# Patient Record
Sex: FEMALE | Race: WHITE | HISPANIC OR LATINO
[De-identification: names, ages, dates, MRNs, and addresses within clinical notes are randomized per-mention and may not be internally consistent; named-entity substitution may affect disease eponyms.]

---

## 2017-03-13 ENCOUNTER — APPOINTMENT (OUTPATIENT)
Dept: PLASTIC SURGERY | Facility: CLINIC | Age: 27
End: 2017-03-13

## 2017-03-13 VITALS
HEIGHT: 64 IN | WEIGHT: 186 LBS | SYSTOLIC BLOOD PRESSURE: 125 MMHG | DIASTOLIC BLOOD PRESSURE: 79 MMHG | TEMPERATURE: 97.8 F | HEART RATE: 70 BPM | BODY MASS INDEX: 31.76 KG/M2

## 2017-03-13 DIAGNOSIS — E65 LOCALIZED ADIPOSITY: ICD-10-CM

## 2017-03-13 DIAGNOSIS — M79.3 PANNICULITIS, UNSPECIFIED: ICD-10-CM

## 2017-03-15 PROBLEM — M79.3 PANNICULITIS: Status: ACTIVE | Noted: 2017-03-15

## 2017-03-15 PROBLEM — E65 ABDOMINAL PANNICULUS: Status: ACTIVE | Noted: 2017-03-15

## 2017-03-16 ENCOUNTER — TRANSCRIPTION ENCOUNTER (OUTPATIENT)
Age: 27
End: 2017-03-16

## 2018-07-07 ENCOUNTER — EMERGENCY (EMERGENCY)
Facility: HOSPITAL | Age: 28
LOS: 1 days | Discharge: HOME | End: 2018-07-07
Attending: EMERGENCY MEDICINE
Payer: COMMERCIAL

## 2018-07-07 VITALS
SYSTOLIC BLOOD PRESSURE: 133 MMHG | RESPIRATION RATE: 20 BRPM | TEMPERATURE: 99 F | HEART RATE: 101 BPM | DIASTOLIC BLOOD PRESSURE: 89 MMHG | OXYGEN SATURATION: 100 %

## 2018-07-07 PROCEDURE — 99283 EMERGENCY DEPT VISIT LOW MDM: CPT | Mod: 25

## 2018-07-07 PROCEDURE — 99282 EMERGENCY DEPT VISIT SF MDM: CPT

## 2018-07-07 RX ORDER — SODIUM CHLORIDE 9 MG/ML
1000 INJECTION INTRAMUSCULAR; INTRAVENOUS; SUBCUTANEOUS ONCE
Qty: 0 | Refills: 0 | Status: DISCONTINUED | OUTPATIENT
Start: 2018-07-07 | End: 2018-07-07

## 2018-07-07 RX ORDER — ACETAMINOPHEN 500 MG
650 TABLET ORAL ONCE
Qty: 0 | Refills: 0 | Status: DISCONTINUED | OUTPATIENT
Start: 2018-07-07 | End: 2018-07-07

## 2018-07-07 NOTE — ED ADULT NURSE NOTE - CHPI ED SYMPTOMS NEG
no vomiting/no abdominal distension/no nausea/no dysuria/no hematuria/no blood in stool/no fever/no burning urination

## 2018-07-07 NOTE — ED PROVIDER NOTE - PLAN OF CARE
You were seen in the emergency department for diarrhea. Please follow up with your primary doctor in the next 3-5 days. Make sure to drink plenty of fluids. Return to the emergency department immediately if you experience fever, inability to keep food down, worsening abdominal pain, or any other concerning symptoms.

## 2018-07-07 NOTE — ED PROVIDER NOTE - MEDICAL DECISION MAKING DETAILS
28f w acute-onset diarrhea and abd cramping w/o n/v. NAD, afebrile, benign abd exam. Sx likely attributable to viral gastroenteritis, low susp for serious intraabd pathology. D/w pt, who would like to go home. Hemodyn stable, tolerating po - no need for iv hydration or further w.u 28f w acute-onset diarrhea and abd cramping w/o n/v. NAD, afebrile, benign abd exam, no signs dehyd. Sx likely attributable to viral gastroenteritis, low susp for serious intraabd pathology. D/w pt, who would like to go home. Hemodyn stable, tolerating po - no need for iv hydration or further w.u 28f w acute-onset diarrhea and abd cramping w/o n/v. NAD, afebrile, benign abd exam, no signs dehyd other than mild tachy to 101 in triage improved since. Sx likely attributable to viral gastroenteritis, low susp for serious intraabd pathology. D/w pt, who would like to go home. Hemodyn stable, tolerating po - no need for iv hydration or further w.u

## 2018-07-07 NOTE — ED PROVIDER NOTE - CARE PLAN
Principal Discharge DX:	Diarrhea  Assessment and plan of treatment:	You were seen in the emergency department for diarrhea. Please follow up with your primary doctor in the next 3-5 days. Make sure to drink plenty of fluids. Return to the emergency department immediately if you experience fever, inability to keep food down, worsening abdominal pain, or any other concerning symptoms.

## 2018-07-07 NOTE — ED PROVIDER NOTE - SKIN, MLM
Skin normal color for race, warm, dry and intact. No evidence of rash. Skin normal color for race, warm, dry and intact. No evidence of rash. Cap refill <2 s

## 2018-07-07 NOTE — ED PROVIDER NOTE - OBJECTIVE STATEMENT
28f no sig PMH here c/o 9 hrs diarrhea. Pt was in [facility] when suddenly started having continuous, liquid NB bm. No n/v, mild abd cramping. 28f no sig PMH here c/o 9 hrs diarrhea. Pt was in [facility] when suddenly started having continuous, liquid NB bm. No fever, no n/v, mild abd cramping. No urinary complaints. No recent new foods, traveled to [place] 1 mo ago. No sick contacts. No medications. No URI sx.

## 2019-03-11 ENCOUNTER — OUTPATIENT (OUTPATIENT)
Dept: OUTPATIENT SERVICES | Facility: HOSPITAL | Age: 29
LOS: 1 days | End: 2019-03-11
Payer: COMMERCIAL

## 2019-03-11 VITALS — HEART RATE: 73 BPM | OXYGEN SATURATION: 99 %

## 2019-03-11 DIAGNOSIS — Z3A.00 WEEKS OF GESTATION OF PREGNANCY NOT SPECIFIED: ICD-10-CM

## 2019-03-11 DIAGNOSIS — O26.899 OTHER SPECIFIED PREGNANCY RELATED CONDITIONS, UNSPECIFIED TRIMESTER: ICD-10-CM

## 2019-03-11 LAB
ALBUMIN SERPL ELPH-MCNC: 3.3 G/DL — SIGNIFICANT CHANGE UP (ref 3.3–5)
ALP SERPL-CCNC: 77 U/L — SIGNIFICANT CHANGE UP (ref 40–120)
ALT FLD-CCNC: 20 U/L — SIGNIFICANT CHANGE UP (ref 4–33)
ANION GAP SERPL CALC-SCNC: 11 MMO/L — SIGNIFICANT CHANGE UP (ref 7–14)
APPEARANCE UR: SIGNIFICANT CHANGE UP
AST SERPL-CCNC: 24 U/L — SIGNIFICANT CHANGE UP (ref 4–32)
BACTERIA # UR AUTO: NEGATIVE — SIGNIFICANT CHANGE UP
BILIRUB SERPL-MCNC: < 0.2 MG/DL — LOW (ref 0.2–1.2)
BILIRUB UR-MCNC: NEGATIVE — SIGNIFICANT CHANGE UP
BLOOD UR QL VISUAL: NEGATIVE — SIGNIFICANT CHANGE UP
BUN SERPL-MCNC: 10 MG/DL — SIGNIFICANT CHANGE UP (ref 7–23)
CALCIUM SERPL-MCNC: 8.9 MG/DL — SIGNIFICANT CHANGE UP (ref 8.4–10.5)
CHLORIDE SERPL-SCNC: 106 MMOL/L — SIGNIFICANT CHANGE UP (ref 98–107)
CO2 SERPL-SCNC: 21 MMOL/L — LOW (ref 22–31)
COLOR SPEC: YELLOW — SIGNIFICANT CHANGE UP
CREAT SERPL-MCNC: 0.54 MG/DL — SIGNIFICANT CHANGE UP (ref 0.5–1.3)
GLUCOSE SERPL-MCNC: 74 MG/DL — SIGNIFICANT CHANGE UP (ref 70–99)
GLUCOSE UR-MCNC: NEGATIVE — SIGNIFICANT CHANGE UP
HCT VFR BLD CALC: 33.1 % — LOW (ref 34.5–45)
HGB BLD-MCNC: 10.9 G/DL — LOW (ref 11.5–15.5)
HYALINE CASTS # UR AUTO: HIGH
KETONES UR-MCNC: NEGATIVE — SIGNIFICANT CHANGE UP
LEUKOCYTE ESTERASE UR-ACNC: NEGATIVE — SIGNIFICANT CHANGE UP
MCHC RBC-ENTMCNC: 28.7 PG — SIGNIFICANT CHANGE UP (ref 27–34)
MCHC RBC-ENTMCNC: 32.9 % — SIGNIFICANT CHANGE UP (ref 32–36)
MCV RBC AUTO: 87.1 FL — SIGNIFICANT CHANGE UP (ref 80–100)
NITRITE UR-MCNC: NEGATIVE — SIGNIFICANT CHANGE UP
NRBC # FLD: 0 K/UL — LOW (ref 25–125)
PH UR: 6 — SIGNIFICANT CHANGE UP (ref 5–8)
PLATELET # BLD AUTO: 278 K/UL — SIGNIFICANT CHANGE UP (ref 150–400)
PMV BLD: 10.1 FL — SIGNIFICANT CHANGE UP (ref 7–13)
POTASSIUM SERPL-MCNC: 4.1 MMOL/L — SIGNIFICANT CHANGE UP (ref 3.5–5.3)
POTASSIUM SERPL-SCNC: 4.1 MMOL/L — SIGNIFICANT CHANGE UP (ref 3.5–5.3)
PROT SERPL-MCNC: 6.1 G/DL — SIGNIFICANT CHANGE UP (ref 6–8.3)
PROT UR-MCNC: 10 — SIGNIFICANT CHANGE UP
RBC # BLD: 3.8 M/UL — SIGNIFICANT CHANGE UP (ref 3.8–5.2)
RBC # FLD: 15.5 % — HIGH (ref 10.3–14.5)
RBC CASTS # UR COMP ASSIST: HIGH (ref 0–?)
SODIUM SERPL-SCNC: 138 MMOL/L — SIGNIFICANT CHANGE UP (ref 135–145)
SP GR SPEC: 1.03 — SIGNIFICANT CHANGE UP (ref 1–1.04)
SQUAMOUS # UR AUTO: SIGNIFICANT CHANGE UP
UROBILINOGEN FLD QL: NORMAL — SIGNIFICANT CHANGE UP
WBC # BLD: 10.5 K/UL — SIGNIFICANT CHANGE UP (ref 3.8–10.5)
WBC # FLD AUTO: 10.5 K/UL — SIGNIFICANT CHANGE UP (ref 3.8–10.5)
WBC UR QL: HIGH (ref 0–?)

## 2019-03-11 PROCEDURE — 93970 EXTREMITY STUDY: CPT | Mod: 26

## 2019-03-11 PROCEDURE — 59025 FETAL NON-STRESS TEST: CPT | Mod: 26

## 2019-03-11 PROCEDURE — 93010 ELECTROCARDIOGRAM REPORT: CPT

## 2019-03-11 PROCEDURE — 76815 OB US LIMITED FETUS(S): CPT | Mod: 26

## 2019-03-11 PROCEDURE — 99213 OFFICE O/P EST LOW 20 MIN: CPT | Mod: 25

## 2019-03-11 RX ORDER — SODIUM CHLORIDE 9 MG/ML
3 INJECTION INTRAMUSCULAR; INTRAVENOUS; SUBCUTANEOUS ONCE
Qty: 0 | Refills: 0 | Status: DISCONTINUED | OUTPATIENT
Start: 2019-03-11 | End: 2019-03-26

## 2019-04-08 ENCOUNTER — EMERGENCY (EMERGENCY)
Facility: HOSPITAL | Age: 29
LOS: 1 days | Discharge: HOME | End: 2019-04-08
Attending: EMERGENCY MEDICINE | Admitting: EMERGENCY MEDICINE
Payer: COMMERCIAL

## 2019-04-08 VITALS
RESPIRATION RATE: 20 BRPM | DIASTOLIC BLOOD PRESSURE: 77 MMHG | HEART RATE: 86 BPM | TEMPERATURE: 98 F | SYSTOLIC BLOOD PRESSURE: 117 MMHG | OXYGEN SATURATION: 100 %

## 2019-04-08 VITALS
HEART RATE: 78 BPM | SYSTOLIC BLOOD PRESSURE: 122 MMHG | RESPIRATION RATE: 17 BRPM | DIASTOLIC BLOOD PRESSURE: 44 MMHG | OXYGEN SATURATION: 100 %

## 2019-04-08 LAB
ALBUMIN SERPL ELPH-MCNC: 3.4 G/DL — SIGNIFICANT CHANGE UP (ref 3.3–5)
ALP SERPL-CCNC: 106 U/L — SIGNIFICANT CHANGE UP (ref 40–120)
ALT FLD-CCNC: 13 U/L — SIGNIFICANT CHANGE UP (ref 4–33)
ANION GAP SERPL CALC-SCNC: 11 MMO/L — SIGNIFICANT CHANGE UP (ref 7–14)
AST SERPL-CCNC: 14 U/L — SIGNIFICANT CHANGE UP (ref 4–32)
BASOPHILS # BLD AUTO: 0.04 K/UL — SIGNIFICANT CHANGE UP (ref 0–0.2)
BASOPHILS NFR BLD AUTO: 0.4 % — SIGNIFICANT CHANGE UP (ref 0–2)
BILIRUB SERPL-MCNC: < 0.2 MG/DL — LOW (ref 0.2–1.2)
BUN SERPL-MCNC: 8 MG/DL — SIGNIFICANT CHANGE UP (ref 7–23)
CALCIUM SERPL-MCNC: 8.7 MG/DL — SIGNIFICANT CHANGE UP (ref 8.4–10.5)
CHLORIDE SERPL-SCNC: 104 MMOL/L — SIGNIFICANT CHANGE UP (ref 98–107)
CO2 SERPL-SCNC: 20 MMOL/L — LOW (ref 22–31)
CREAT SERPL-MCNC: 0.45 MG/DL — LOW (ref 0.5–1.3)
EOSINOPHIL # BLD AUTO: 0.22 K/UL — SIGNIFICANT CHANGE UP (ref 0–0.5)
EOSINOPHIL NFR BLD AUTO: 2 % — SIGNIFICANT CHANGE UP (ref 0–6)
GLUCOSE SERPL-MCNC: 82 MG/DL — SIGNIFICANT CHANGE UP (ref 70–99)
HCT VFR BLD CALC: 33.1 % — LOW (ref 34.5–45)
HGB BLD-MCNC: 10.9 G/DL — LOW (ref 11.5–15.5)
IMM GRANULOCYTES NFR BLD AUTO: 1 % — SIGNIFICANT CHANGE UP (ref 0–1.5)
LYMPHOCYTES # BLD AUTO: 2.78 K/UL — SIGNIFICANT CHANGE UP (ref 1–3.3)
LYMPHOCYTES # BLD AUTO: 25.5 % — SIGNIFICANT CHANGE UP (ref 13–44)
MCHC RBC-ENTMCNC: 28.5 PG — SIGNIFICANT CHANGE UP (ref 27–34)
MCHC RBC-ENTMCNC: 32.9 % — SIGNIFICANT CHANGE UP (ref 32–36)
MCV RBC AUTO: 86.4 FL — SIGNIFICANT CHANGE UP (ref 80–100)
MONOCYTES # BLD AUTO: 0.74 K/UL — SIGNIFICANT CHANGE UP (ref 0–0.9)
MONOCYTES NFR BLD AUTO: 6.8 % — SIGNIFICANT CHANGE UP (ref 2–14)
NEUTROPHILS # BLD AUTO: 7.03 K/UL — SIGNIFICANT CHANGE UP (ref 1.8–7.4)
NEUTROPHILS NFR BLD AUTO: 64.3 % — SIGNIFICANT CHANGE UP (ref 43–77)
NRBC # FLD: 0 K/UL — SIGNIFICANT CHANGE UP (ref 0–0)
PLATELET # BLD AUTO: 253 K/UL — SIGNIFICANT CHANGE UP (ref 150–400)
PMV BLD: 10.2 FL — SIGNIFICANT CHANGE UP (ref 7–13)
POTASSIUM SERPL-MCNC: 3.8 MMOL/L — SIGNIFICANT CHANGE UP (ref 3.5–5.3)
POTASSIUM SERPL-SCNC: 3.8 MMOL/L — SIGNIFICANT CHANGE UP (ref 3.5–5.3)
PROT SERPL-MCNC: 6.1 G/DL — SIGNIFICANT CHANGE UP (ref 6–8.3)
RBC # BLD: 3.83 M/UL — SIGNIFICANT CHANGE UP (ref 3.8–5.2)
RBC # FLD: 14.1 % — SIGNIFICANT CHANGE UP (ref 10.3–14.5)
SODIUM SERPL-SCNC: 135 MMOL/L — SIGNIFICANT CHANGE UP (ref 135–145)
TSH SERPL-MCNC: 3.05 UIU/ML — SIGNIFICANT CHANGE UP (ref 0.27–4.2)
WBC # BLD: 10.92 K/UL — HIGH (ref 3.8–10.5)
WBC # FLD AUTO: 10.92 K/UL — HIGH (ref 3.8–10.5)

## 2019-04-08 PROCEDURE — 93010 ELECTROCARDIOGRAM REPORT: CPT

## 2019-04-08 PROCEDURE — 99283 EMERGENCY DEPT VISIT LOW MDM: CPT | Mod: 25

## 2019-04-08 NOTE — ED ADULT NURSE NOTE - OBJECTIVE STATEMENT
pt a&o x3 c/o intermittent palpitations x 1 month with an episode of sudden onset today. pt currently deny any symptoms but reports during dinner she had an episdoe of palpitations with sob. [facility], awaiting results. . also endorsing white clear discharge since today. nad noted. vss. will monitor

## 2019-04-08 NOTE — ED ADULT TRIAGE NOTE - CHIEF COMPLAINT QUOTE
pt is 8 months pregnant and is having palpitation and  SOB. BRYNN 05/19. Pt was seen here a month ago for the same and was seen by cardiologist and had echo and Holter monitor . Did not get the result of the test. Tonight as she was having dinner she started having SOB. G2. Pt denies  CP or any other symptoms. Also c/o vaginal discharge which is excessive today.  Positive fetal movements. L&D was called and pt is to be seen in the  ER.

## 2019-04-08 NOTE — ED PROVIDER NOTE - CLINICAL SUMMARY MEDICAL DECISION MAKING FREE TEXT BOX
Jonathan Weil, PGY2 - palpitations and dyspnea similar to prior episodes, now resolved. Low suspicion for a serious cardiopulmonary process such as cardiomyopathy of pregnancy or PE given resolution, intermittent nature, clear lungs, normal vitals, negative prior BLE dopplers. Will obtain labs, repeat ECG, to L&D for eval of vaginal discharge if negative eval here.

## 2019-04-08 NOTE — ED PROVIDER NOTE - OBJECTIVE STATEMENT
28F  ~8 months gestation by sono p/w shortness of breath. She has had intermittent episodes of shortness of breath associated w/ palpitations, occurring several times per week, lasting minutes to hours, for the past month. Seen by cardiology for echo and loop recorder, unknown results. Today had another episode an hour or so before arrival, since resolved, similar to prior episodes. Additionally she has had a slow but consistent leak of clear vaginal discharge starting earlier today. No abd pain. Has felt normal fetal movement. 28F  ~8 months gestation by sono (LMP 18) p/w shortness of breath. She has had intermittent episodes of shortness of breath associated w/ palpitations, occurring several times per week, lasting minutes to hours, for the past month. Seen by cardiology for echo and loop recorder, unknown results. Today had another episode an hour or so before arrival, since resolved, similar to prior episodes. Additionally she has had a slow but consistent leak of clear vaginal discharge starting earlier today. No abd pain. Has felt normal fetal movement.

## 2019-04-08 NOTE — ED PROVIDER NOTE - PROGRESS NOTE DETAILS
Jonathan Weil, PGY2 - labs and ECG unrevealing. patient is asymptomatic. DC to L&D. Discussed importance of f/u with cardiology for echo/loop recorder results tomorrow.

## 2019-04-08 NOTE — ED PROVIDER NOTE - ATTENDING CONTRIBUTION TO CARE
28F  ~8 months gestation by sono (LMP 18) p/w shortness of breath. She has had intermittent episodes of shortness of breath associated w/ palpitations, occurring several times per week, lasting minutes to hours, for the past month. Seen by cardiology for echo and loop recorder, unknown results. Today had another episode an hour or so before arrival, since resolved, similar to prior episodes. Additionally she has had a slow but consistent leak of clear vaginal discharge starting earlier today. No abd pain. Has felt normal fetal movement. Currently feels well No CP No SOB No fever no cough. FMF. On exam: VSS HR 78, sat 100%, RR 14, lungs, heart, pulses, neuro, extremities, skin, all normal on exam. Abdomen c/w 34w gestation. FMF FH of 146 detected with doppler. EKG Normal. IMP: Transient dyspnea at 34 weeks gestation. No clinical evidence of acute cardiac or pulmonary emergency. Pt being monitored and observed. awaiting labs. Also of concern clear vag leaking. OB L&D triage [id number] notified and GYN resident Dr Gallagher notified.

## 2019-04-09 ENCOUNTER — OUTPATIENT (OUTPATIENT)
Dept: OUTPATIENT SERVICES | Facility: HOSPITAL | Age: 29
LOS: 1 days | Discharge: HOME | End: 2019-04-09
Payer: COMMERCIAL

## 2019-04-09 VITALS — HEART RATE: 66 BPM | OXYGEN SATURATION: 97 %

## 2019-04-09 DIAGNOSIS — Z98.890 OTHER SPECIFIED POSTPROCEDURAL STATES: Chronic | ICD-10-CM

## 2019-04-09 DIAGNOSIS — Z83.79 FAMILY HISTORY OF OTHER DISEASES OF THE DIGESTIVE SYSTEM: Chronic | ICD-10-CM

## 2019-04-09 DIAGNOSIS — Z3A.00 WEEKS OF GESTATION OF PREGNANCY NOT SPECIFIED: ICD-10-CM

## 2019-04-09 DIAGNOSIS — O26.899 OTHER SPECIFIED PREGNANCY RELATED CONDITIONS, UNSPECIFIED TRIMESTER: ICD-10-CM

## 2019-04-09 PROCEDURE — 59025 FETAL NON-STRESS TEST: CPT | Mod: 26

## 2019-04-09 NOTE — OB PROVIDER TRIAGE NOTE - NSHPLABSRESULTS_GEN_ALL_CORE
10.9   10.92 )-----------( 253      ( 08 Apr 2019 22:45 )             33.1       04-08    135  |  104  |  8   ----------------------------<  82  3.8   |  20<L>  |  0.45<L>    Ca    8.7      08 Apr 2019 22:45    TPro  6.1  /  Alb  3.4  /  TBili  < 0.2<L>  /  DBili  x   /  AST  14  /  ALT  13  /  AlkPhos  106  04-08

## 2019-04-09 NOTE — OB RN TRIAGE NOTE - PSH
delivery delivered  C/S 2009  FH: cholecystectomy  Lap Stephany 2009  History of dilatation and curettage  D/C x 2  History of gastric surgery  Gastric sleeve 2016

## 2019-04-09 NOTE — OB RN TRIAGE NOTE - CHIEF COMPLAINT QUOTE
For OB clearance, Pt seen in ER  for SOB and palpitation For OB clearance, Pt seen in ER  for SOB and palpitation.  "I have been feeling wet all day.  Not sure if I broke my water."

## 2019-04-09 NOTE — OB PROVIDER TRIAGE NOTE - NS_MATERNALCONCERNS_OBGYN_ALL_OB_FT
Patient states she is followed by Cardiologist due to h/o SOB and palpitation for the past month  She had echo done and has a follow up for a holter monitor

## 2019-04-09 NOTE — OB PROVIDER TRIAGE NOTE - NSHPPHYSICALEXAM_GEN_ALL_CORE
Vital Signs Last 24 Hrs  T(C): 36.8 (09 Apr 2019 00:46), Max: 36.9 (08 Apr 2019 20:32)  T(F): 98.2 (09 Apr 2019 00:46), Max: 98.5 (08 Apr 2019 20:32)  HR: 66 (09 Apr 2019 01:17) (63 - 86)  BP: 108/55 (09 Apr 2019 00:58) (108/55 - 122/44)  BP(mean): --  RR: 18 (09 Apr 2019 00:46) (17 - 20)  SpO2: 97% (09 Apr 2019 01:17) (97% - 100%)    Abdomen: Gravid, soft, NT  NST: Reactive, mod variability, no decels  Rotan: no contractions    SSE: no pooling, nitrazine neg, fern neg; Cervix appears long and closed on SSE    TAS: SLIUP, Cephalic, DANY: 13, BPP 8/8

## 2019-04-09 NOTE — OB PROVIDER TRIAGE NOTE - HISTORY OF PRESENT ILLNESS
SONAL KHALIL  28y  Female [id number]  28y  y/o  at 34w2d sent to triage from ED for OB clearance. Pt was seen in ED due to complaints of SOB and palpitation. Pt was evaluated and cleared.  Present reports +FM, no vaginal bleeding, States had a couple episodes of wetness earlier today, no other episodes.  Denies any cramping or contractions.    AP complications: Denies

## 2019-10-16 ENCOUNTER — TRANSCRIPTION ENCOUNTER (OUTPATIENT)
Age: 29
End: 2019-10-16

## 2020-02-10 PROBLEM — I10 ESSENTIAL (PRIMARY) HYPERTENSION: Chronic | Status: ACTIVE | Noted: 2019-04-09

## 2020-02-12 ENCOUNTER — APPOINTMENT (OUTPATIENT)
Dept: GASTROENTEROLOGY | Facility: CLINIC | Age: 30
End: 2020-02-12
Payer: MEDICAID

## 2020-02-12 VITALS
WEIGHT: 198 LBS | HEIGHT: 64 IN | RESPIRATION RATE: 14 BRPM | OXYGEN SATURATION: 100 % | DIASTOLIC BLOOD PRESSURE: 78 MMHG | SYSTOLIC BLOOD PRESSURE: 120 MMHG | BODY MASS INDEX: 33.8 KG/M2 | HEART RATE: 98 BPM

## 2020-02-12 DIAGNOSIS — Z80.0 FAMILY HISTORY OF MALIGNANT NEOPLASM OF DIGESTIVE ORGANS: ICD-10-CM

## 2020-02-12 DIAGNOSIS — Z86.79 PERSONAL HISTORY OF OTHER DISEASES OF THE CIRCULATORY SYSTEM: ICD-10-CM

## 2020-02-12 DIAGNOSIS — K92.1 MELENA: ICD-10-CM

## 2020-02-12 DIAGNOSIS — Z87.09 PERSONAL HISTORY OF OTHER DISEASES OF THE RESPIRATORY SYSTEM: ICD-10-CM

## 2020-02-12 DIAGNOSIS — Z78.9 OTHER SPECIFIED HEALTH STATUS: ICD-10-CM

## 2020-02-12 PROCEDURE — 99205 OFFICE O/P NEW HI 60 MIN: CPT

## 2020-02-12 NOTE — HISTORY OF PRESENT ILLNESS
[de-identified] : 28 y/o woman with Hx of obesity s/p gastric sleeve (2016), lap hansa, , iron def anemia requiring iron infusions who is referred for anemia and to have blood in stool. \par  \par In 2019 she was found to have H/H of 11.4/37.5. \par \par Patient was anemia during pregnancy, requiring iron infusion - last infusion almost a year ago. Was told that anemia was most likely from menorrhagia - still with heavy menses for three days.  \par \par Stools are hard since gastric sleeve - when she strains she will see red blood in the toilet bowl.   She has a movement every day, twice a day.  Sometimes she has urgency and will have diarrhea.  \par \par Taking omeprazole for acid reflux since the gastric sleeve - Takes omeprazole on a PRN bases.  \par \par \par \par Last EGD was in 2018 - normal from what she recalls.  \par \par Never had a colonoscopy. \par \par Maternal grandmother had stomach cancer.   Patient denies family Hx of other GI cancers. Denies IBD in family. \par \par All other review of systems are negative.  Denies cardiac symptoms.\par \par Has a 9 month old.

## 2020-02-12 NOTE — PHYSICAL EXAM
[General Appearance - Alert] : alert [General Appearance - In No Acute Distress] : in no acute distress [Sclera] : the sclera and conjunctiva were normal [Hearing Threshold Finger Rub Not [place]] : hearing was normal [Extraocular Movements] : extraocular movements were intact [Outer Ear] : the ears and nose were normal in appearance [Neck Cervical Mass (___cm)] : no neck mass was observed [Neck Appearance] : the appearance of the neck was normal [Heart Rate And Rhythm] : heart rate was normal and rhythm regular [Auscultation Breath Sounds / Voice Sounds] : lungs were clear to auscultation bilaterally [Heart Sounds] : normal S1 and S2 [Murmurs] : no murmurs [Heart Sounds Gallop] : no gallops [Abdomen Soft] : soft [Heart Sounds Pericardial Friction Rub] : no pericardial rub [Bowel Sounds] : normal bowel sounds [Abdomen Tenderness] : non-tender [Abdomen Mass (___ Cm)] : no abdominal mass palpated [Cervical Lymph Nodes Enlarged Anterior Bilaterally] : anterior cervical [Cervical Lymph Nodes Enlarged Posterior Bilaterally] : posterior cervical [Supraclavicular Lymph Nodes Enlarged Bilaterally] : supraclavicular [Abnormal Walk] : normal gait [Nail Clubbing] : no clubbing  or cyanosis of the fingernails [] : no rash [Oriented To Time, Place, And Person] : oriented to person, place, and time [Skin Color & Pigmentation] : normal skin color and pigmentation [Impaired Insight] : insight and judgment were intact [Affect] : the affect was normal

## 2020-02-12 NOTE — ASSESSMENT
[FreeTextEntry1] : 30 y/o woman with Hx of obesity s/p gastric sleeve (2016), lap hansa, , iron def anemia requiring iron infusions who is referred for anemia and to have blood in stool. Differential diagnosis reviewed.  Anemia likely from menorrhagia.  Blood in stool likely from hemorrhoids.  However she is concerned, her grandmother had stomach cancer.    I will therefore plan for an upper endoscopy under monitored anesthesia care to rule out esophagitis, peptic ulcer disease, H.pylori gastritis etc.   I will also plan for a colonoscopy to rule out inflammatory bowel disease, colon polyps, colorectal cancer etc. under monitored anesthesia care.  Risks such as perforation requiring surgery, bleeding, infection, colitis, internal organ injury, etc, and risks of anesthesia including cardiopulmonary compromise were discussed with patient.  Patient verbalized understanding and agrees to proceed with the planned procedure.\par \par Metamucil once a day with plenty of water a day.

## 2020-03-06 ENCOUNTER — TRANSCRIPTION ENCOUNTER (OUTPATIENT)
Age: 30
End: 2020-03-06

## 2020-04-20 NOTE — ED ADULT NURSE NOTE - DOES PATIENT HAVE ADVANCE DIRECTIVE
Â· Med Name & Dose: Metoprolol  Â· Last refill was 11-6-19 Number of Refills sent: 1  Â· Quantity of medication given 90 day supply  Â· Last visit for that condition 11-6-19  Â· Date of next appt: 6/15/2020  Â· Date of any Lab results pertaining to medication: 4-    rx sent to pharmacy to get patient to appt 6- unk

## 2020-06-15 ENCOUNTER — APPOINTMENT (OUTPATIENT)
Dept: DISASTER EMERGENCY | Facility: CLINIC | Age: 30
End: 2020-06-15

## 2020-06-15 DIAGNOSIS — Z01.818 ENCOUNTER FOR OTHER PREPROCEDURAL EXAMINATION: ICD-10-CM

## 2020-06-15 LAB — SARS-COV-2 N GENE NPH QL NAA+PROBE: NOT DETECTED

## 2020-06-17 ENCOUNTER — TRANSCRIPTION ENCOUNTER (OUTPATIENT)
Age: 30
End: 2020-06-17

## 2020-06-18 ENCOUNTER — OUTPATIENT (OUTPATIENT)
Dept: OUTPATIENT SERVICES | Facility: HOSPITAL | Age: 30
LOS: 1 days | End: 2020-06-18
Payer: MEDICAID

## 2020-06-18 ENCOUNTER — APPOINTMENT (OUTPATIENT)
Dept: GASTROENTEROLOGY | Facility: HOSPITAL | Age: 30
End: 2020-06-18

## 2020-06-18 ENCOUNTER — RESULT REVIEW (OUTPATIENT)
Age: 30
End: 2020-06-18

## 2020-06-18 DIAGNOSIS — M79.3 PANNICULITIS, UNSPECIFIED: ICD-10-CM

## 2020-06-18 DIAGNOSIS — Z98.890 OTHER SPECIFIED POSTPROCEDURAL STATES: Chronic | ICD-10-CM

## 2020-06-18 DIAGNOSIS — Z83.79 FAMILY HISTORY OF OTHER DISEASES OF THE DIGESTIVE SYSTEM: Chronic | ICD-10-CM

## 2020-06-18 DIAGNOSIS — E65 LOCALIZED ADIPOSITY: ICD-10-CM

## 2020-06-18 LAB — HCG UR QL: NEGATIVE — SIGNIFICANT CHANGE UP

## 2020-06-18 PROCEDURE — 88313 SPECIAL STAINS GROUP 2: CPT | Mod: 26

## 2020-06-18 PROCEDURE — 45385 COLONOSCOPY W/LESION REMOVAL: CPT

## 2020-06-18 PROCEDURE — 88305 TISSUE EXAM BY PATHOLOGIST: CPT

## 2020-06-18 PROCEDURE — 43239 EGD BIOPSY SINGLE/MULTIPLE: CPT

## 2020-06-18 PROCEDURE — 81025 URINE PREGNANCY TEST: CPT

## 2020-06-18 PROCEDURE — 88312 SPECIAL STAINS GROUP 1: CPT

## 2020-06-18 PROCEDURE — 88305 TISSUE EXAM BY PATHOLOGIST: CPT | Mod: 26

## 2020-06-18 PROCEDURE — 88312 SPECIAL STAINS GROUP 1: CPT | Mod: 26

## 2020-06-18 PROCEDURE — 88313 SPECIAL STAINS GROUP 2: CPT

## 2020-08-03 ENCOUNTER — APPOINTMENT (OUTPATIENT)
Dept: GASTROENTEROLOGY | Facility: CLINIC | Age: 30
End: 2020-08-03
Payer: MEDICAID

## 2020-08-03 VITALS
HEART RATE: 99 BPM | DIASTOLIC BLOOD PRESSURE: 85 MMHG | WEIGHT: 198 LBS | HEIGHT: 64 IN | RESPIRATION RATE: 14 BRPM | BODY MASS INDEX: 33.8 KG/M2 | SYSTOLIC BLOOD PRESSURE: 124 MMHG | OXYGEN SATURATION: 99 %

## 2020-08-03 DIAGNOSIS — D12.6 BENIGN NEOPLASM OF COLON, UNSPECIFIED: ICD-10-CM

## 2020-08-03 PROCEDURE — 99214 OFFICE O/P EST MOD 30 MIN: CPT

## 2020-08-03 NOTE — PHYSICAL EXAM
[General Appearance - Alert] : alert [General Appearance - In No Acute Distress] : in no acute distress [Sclera] : the sclera and conjunctiva were normal [Extraocular Movements] : extraocular movements were intact [Outer Ear] : the ears and nose were normal in appearance [Hearing Threshold Finger Rub Not [place]] : hearing was normal [Neck Cervical Mass (___cm)] : no neck mass was observed [Neck Appearance] : the appearance of the neck was normal [Auscultation Breath Sounds / Voice Sounds] : lungs were clear to auscultation bilaterally [Heart Rate And Rhythm] : heart rate was normal and rhythm regular [Murmurs] : no murmurs [Heart Sounds Gallop] : no gallops [Heart Sounds] : normal S1 and S2 [Bowel Sounds] : normal bowel sounds [Abdomen Soft] : soft [Heart Sounds Pericardial Friction Rub] : no pericardial rub [Abdomen Mass (___ Cm)] : no abdominal mass palpated [Abdomen Tenderness] : non-tender [Supraclavicular Lymph Nodes Enlarged Bilaterally] : supraclavicular [Cervical Lymph Nodes Enlarged Posterior Bilaterally] : posterior cervical [Cervical Lymph Nodes Enlarged Anterior Bilaterally] : anterior cervical [Abnormal Walk] : normal gait [Nail Clubbing] : no clubbing  or cyanosis of the fingernails [Musculoskeletal - Swelling] : no joint swelling seen [Skin Color & Pigmentation] : normal skin color and pigmentation [Oriented To Time, Place, And Person] : oriented to person, place, and time [Affect] : the affect was normal [] : no rash [Impaired Insight] : insight and judgment were intact

## 2020-08-03 NOTE — HISTORY OF PRESENT ILLNESS
[de-identified] : 29 y/o woman with Hx of obesity s/p gastric sleeve (2016), lap hansa, , iron def anemia requiring iron infusions who presents for follow up after recent EGD/colonoscopy \par  \par 2020 the patient had a mild erosive esophagitis status post biopsy, small hiatal hernia, sleeve gastrectomy with antral nodule status post biopsy, mild antral erythema this was biopsied, normal duodenum status post biopsy to rule out villous atrophy as the cause of anemia.\par \par The patient had a colonoscopy on the same day and was found to have 8 mm sessile polyp in ascending colon status post cold snare polypectomy. Quality of the bowel prep was adequate for polyps greater than 6 mm.  The colon polyp was a tubular adenoma.\par \par Duodenal biopsies were negative for celiac disease. 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 GE junction biopsies revealed esophagitis with eosinophils count of 11 hpf. \par \par Patient reports having irregular bowel habits - sometimes stools are stringy, pellets, sometimes loose, sometimes feels bloated, occasional nausea.  Occasional heartburn especially after eating spicy foods.  She denies vomiting. \par \par \par \par \par From prior notes: \par In 2019 she was found to have H/H of 11.4/37.5. \par \par Patient was anemia during pregnancy, requiring iron infusion - last infusion almost a year ago. Was told that anemia was most likely from menorrhagia - still with heavy menses for three days. \par \par Stools are hard since gastric sleeve - when she strains she will see red blood in the toilet bowl. She has a movement every day, twice a day. Sometimes she has urgency and will have diarrhea. \par \par Taking omeprazole for acid reflux since the gastric sleeve - Takes omeprazole on a PRN bases. \par \par \par \par Last EGD was in 2018 - normal from what she recalls. \par \par Never had a colonoscopy. \par \par Maternal grandmother had stomach cancer. Patient denies family Hx of other GI cancers. Denies IBD in family.

## 2020-08-03 NOTE — ASSESSMENT
[FreeTextEntry1] : IMPRESSION\par 1.  Erosive Esophagitis\par 2.  Colon tubular adenoma - bowel prep was adequate for polyps >6 mm) \par 3.  Antral nodule suggestive of hamartomatous polyp\par 4.  History of Iron def anemia requiring iron infusions - initially referred to exclude GI causes, s/p only EGD/colonoscopy - capsule not yet done\par 5.  Irregular bowel habits\par 6.  Bloating\par 7.  Multiple abdominal surgeries (s/p gastric sleeve (2016), lap hansa, )\par \par \par \par \par \par PLAN: \par 1.  I will plan for an upper endoscopy under monitored anesthesia care to rule out persistent erosive esophagitis,  Chavez's esophagus, peptic ulcer disease, H.pylori gastritis etc.   Risks, benefits, and alternatives of the procedure were discussed with the patient. Patient understands and agrees to proceed with the planned procedure.\par 2.  I have advised the patient to arrange for a colonoscopy to rule out colon polyps, colorectal cancer etc. under monitored anesthesia care.  Risks such as perforation  (4 in 10,000) requiring surgery, bleeding (8 in 10,000), infection, diverticulitis, colitis, missed colon cancer (2% to 6%), internal organ injury, etc, risks of bowel prep including colitis, syncope, adverse reaction to medication etc. and risks of anesthesia including cardiopulmonary compromise were discussed with patient.  Patient verbalized understanding and agrees to proceed with the planned procedure.\par 3.  Recommended a capsule endoscopy to r/o small bowel causes of iron def anemia - risks reviewed - alternatives to a capsule endoscopy such as CT enterography was discussed and she would like a capsule endoscopy. \par 4.  Omeprazole once a day\par 5.  Metamucil once a day

## 2020-08-10 ENCOUNTER — APPOINTMENT (OUTPATIENT)
Dept: GASTROENTEROLOGY | Facility: CLINIC | Age: 30
End: 2020-08-10

## 2020-11-13 ENCOUNTER — APPOINTMENT (OUTPATIENT)
Dept: DERMATOLOGY | Facility: CLINIC | Age: 30
End: 2020-11-13
Payer: MEDICAID

## 2020-11-13 VITALS — HEIGHT: 64 IN | WEIGHT: 200 LBS | BODY MASS INDEX: 34.15 KG/M2

## 2020-11-13 DIAGNOSIS — L73.9 FOLLICULAR DISORDER, UNSPECIFIED: ICD-10-CM

## 2020-11-13 PROCEDURE — 99072 ADDL SUPL MATRL&STAF TM PHE: CPT

## 2020-11-13 PROCEDURE — 99203 OFFICE O/P NEW LOW 30 MIN: CPT

## 2021-01-03 ENCOUNTER — LABORATORY RESULT (OUTPATIENT)
Age: 31
End: 2021-01-03

## 2021-01-04 ENCOUNTER — APPOINTMENT (OUTPATIENT)
Dept: DISASTER EMERGENCY | Facility: CLINIC | Age: 31
End: 2021-01-04

## 2021-01-06 ENCOUNTER — TRANSCRIPTION ENCOUNTER (OUTPATIENT)
Age: 31
End: 2021-01-06

## 2021-01-07 ENCOUNTER — APPOINTMENT (OUTPATIENT)
Dept: GASTROENTEROLOGY | Facility: HOSPITAL | Age: 31
End: 2021-01-07

## 2021-01-07 ENCOUNTER — RESULT REVIEW (OUTPATIENT)
Age: 31
End: 2021-01-07

## 2021-01-07 ENCOUNTER — OUTPATIENT (OUTPATIENT)
Dept: OUTPATIENT SERVICES | Facility: HOSPITAL | Age: 31
LOS: 1 days | End: 2021-01-07
Payer: MEDICAID

## 2021-01-07 DIAGNOSIS — Z83.79 FAMILY HISTORY OF OTHER DISEASES OF THE DIGESTIVE SYSTEM: Chronic | ICD-10-CM

## 2021-01-07 DIAGNOSIS — Z98.890 OTHER SPECIFIED POSTPROCEDURAL STATES: Chronic | ICD-10-CM

## 2021-01-07 DIAGNOSIS — D50.9 IRON DEFICIENCY ANEMIA, UNSPECIFIED: ICD-10-CM

## 2021-01-07 LAB — HCG UR QL: NEGATIVE — SIGNIFICANT CHANGE UP

## 2021-01-07 PROCEDURE — 43239 EGD BIOPSY SINGLE/MULTIPLE: CPT

## 2021-01-07 PROCEDURE — 88312 SPECIAL STAINS GROUP 1: CPT | Mod: 26

## 2021-01-07 PROCEDURE — 45378 DIAGNOSTIC COLONOSCOPY: CPT

## 2021-01-07 PROCEDURE — 88312 SPECIAL STAINS GROUP 1: CPT

## 2021-01-07 PROCEDURE — 88305 TISSUE EXAM BY PATHOLOGIST: CPT

## 2021-01-07 PROCEDURE — 88313 SPECIAL STAINS GROUP 2: CPT

## 2021-01-07 PROCEDURE — 88305 TISSUE EXAM BY PATHOLOGIST: CPT | Mod: 26

## 2021-01-07 PROCEDURE — 81025 URINE PREGNANCY TEST: CPT

## 2021-01-07 PROCEDURE — 88313 SPECIAL STAINS GROUP 2: CPT | Mod: 26

## 2021-02-02 ENCOUNTER — APPOINTMENT (OUTPATIENT)
Dept: GASTROENTEROLOGY | Facility: CLINIC | Age: 31
End: 2021-02-02
Payer: MEDICAID

## 2021-02-02 PROCEDURE — ZZZZZ: CPT

## 2021-03-08 ENCOUNTER — APPOINTMENT (OUTPATIENT)
Dept: GASTROENTEROLOGY | Facility: CLINIC | Age: 31
End: 2021-03-08
Payer: MEDICAID

## 2021-03-08 PROCEDURE — 99072 ADDL SUPL MATRL&STAF TM PHE: CPT

## 2021-03-08 PROCEDURE — 99214 OFFICE O/P EST MOD 30 MIN: CPT

## 2021-03-08 NOTE — PHYSICAL EXAM
[General Appearance - Alert] : alert [General Appearance - In No Acute Distress] : in no acute distress [Sclera] : the sclera and conjunctiva were normal [Extraocular Movements] : extraocular movements were intact [Outer Ear] : the ears and nose were normal in appearance [Hearing Threshold Finger Rub Not [place]] : hearing was normal [Neck Appearance] : the appearance of the neck was normal [Neck Cervical Mass (___cm)] : no neck mass was observed [Auscultation Breath Sounds / Voice Sounds] : lungs were clear to auscultation bilaterally [Heart Rate And Rhythm] : heart rate was normal and rhythm regular [Heart Sounds] : normal S1 and S2 [Heart Sounds Gallop] : no gallops [Murmurs] : no murmurs [Heart Sounds Pericardial Friction Rub] : no pericardial rub [Bowel Sounds] : normal bowel sounds [Abdomen Soft] : soft [Abdomen Tenderness] : non-tender [Abdomen Mass (___ Cm)] : no abdominal mass palpated [Cervical Lymph Nodes Enlarged Posterior Bilaterally] : posterior cervical [Cervical Lymph Nodes Enlarged Anterior Bilaterally] : anterior cervical [Supraclavicular Lymph Nodes Enlarged Bilaterally] : supraclavicular [Abnormal Walk] : normal gait [Nail Clubbing] : no clubbing  or cyanosis of the fingernails [Skin Color & Pigmentation] : normal skin color and pigmentation [] : no rash [Oriented To Time, Place, And Person] : oriented to person, place, and time [Impaired Insight] : insight and judgment were intact [Affect] : the affect was normal

## 2021-03-09 ENCOUNTER — NON-APPOINTMENT (OUTPATIENT)
Age: 31
End: 2021-03-09

## 2021-03-09 NOTE — HISTORY OF PRESENT ILLNESS
[de-identified] : 31 y/o woman with Hx of obesity s/p gastric sleeve (2016), lap hansa, , iron def anemia requiring iron infusions who was initially referred for iron def anemia with blood in stool. Her for a capsule endoscopy\par \par Has been taking omeprazole - if she does not take it, then regurgitation, burning in throat. \par no dysaphia\par no nausea no vomiting. \par \par \par \par Initial visit 2020: \par In 2019 she was found to have H/H of 11.4/37.5. \par \par Patient was anemia during pregnancy, requiring iron infusion - last infusion almost a year ago. Was told that anemia was most likely from menorrhagia - still with heavy menses for three days. \par \par Stools are hard since gastric sleeve - when she strains she will see red blood in the toilet bowl. She has a movement every day, twice a day. Sometimes she has urgency and will have diarrhea. \par \par Taking omeprazole for acid reflux since the gastric sleeve - Takes omeprazole on a PRN bases. \par \par Last EGD was in 2018 - normal from what she recalls. \par \par Never had a colonoscopy. \par \par Maternal grandmother had stomach cancer. Patient denies family Hx of other GI cancers. Denies IBD in family. \par  \par \par \par Aug 2020 visit: \par 2020 the patient had a mild erosive esophagitis status post biopsy, small hiatal hernia, sleeve gastrectomy with antral nodule status post biopsy, mild antral erythema this was biopsied, normal duodenum status post biopsy to rule out villous atrophy as the cause of anemia.\par \par The patient had a colonoscopy on the same day and was found to have 8 mm sessile polyp in ascending colon status post cold snare polypectomy. Quality of the bowel prep was adequate for polyps greater than 6 mm. The colon polyp was a tubular adenoma.\par \par Duodenal biopsies were negative for celiac disease. 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 GE junction biopsies revealed esophagitis with eosinophils count of 11 hpf. \par \par Patient reports having irregular bowel habits - sometimes stools are stringy, pellets, sometimes loose, sometimes feels bloated, occasional nausea. Occasional heartburn especially after eating spicy foods. She denies vomiting. \par \par \par EGD 2021 showed linear erythema in striped fashion in the distal esophagus s/p bx, small antral nodule along the gastric sleeve anastomosis s/p bx, normal duodenum. Eventual repeat EGD. Antral nodule hyperplastic polyp negative for H. pylori. Esophagitis with rare intraepithelial eosinophils negative for intestinal metaplasia. \par \par \par \par Colonoscopy in 2021: Mild internal hemorrhoids, no polyps discovered. Repeat colonoscopy in 5 years. \par \par \par \par Telehealth visit on 2021: \par She has been taking omeprazole twice a day. Rarely with omeprazole twice she still gets heartburn. Without the PPI she gets heartburn all the time. \par \par No nausea, no vomiting.\par No melena and no hematochezia.

## 2021-03-09 NOTE — ASSESSMENT
[FreeTextEntry1] : \par IMPRESSION\par 1. Erosive Esophagitis in 2020\par               - Repeat EGD 2021 showed linear erythema in distal esophagus s/p bx, biopsies showing                 esophagitis with rare intraepithelial eosinophils negative for intestinal metaplasia. Antral nodule                 hyperplastic polyp negative for H. pylori. \par 2. Low risk adenoma in 2020 - repeat colonoscopy in 5 years for surveillance. \par 3. History of Iron def anemia requiring iron infusions - initially referred to exclude GI causes, s/p only     EGD/colonoscopy - capsule not yet done\par 4. Multiple abdominal surgeries (s/p gastric sleeve (2016), lap hansa, )\par \par \par \par PLAN: \par Continue omeprazole 40 mg twice a day \par Repeat endoscopy in 2 to 3 months to document healing of esophagus\par Capsule endoscopy to r/o small bowel pathology - risks such as bowel obstruction requiring surgery were reviewed she would like to proceed.  Consent formed signed.  \par Colonoscopy in 5 years for surveillance. \par \par  \par  \par \par

## 2021-03-12 ENCOUNTER — NON-APPOINTMENT (OUTPATIENT)
Age: 31
End: 2021-03-12

## 2021-03-18 ENCOUNTER — NON-APPOINTMENT (OUTPATIENT)
Age: 31
End: 2021-03-18

## 2021-03-18 ENCOUNTER — APPOINTMENT (OUTPATIENT)
Dept: RADIOLOGY | Facility: CLINIC | Age: 31
End: 2021-03-18
Payer: MEDICAID

## 2021-03-18 ENCOUNTER — OUTPATIENT (OUTPATIENT)
Dept: OUTPATIENT SERVICES | Facility: HOSPITAL | Age: 31
LOS: 1 days | End: 2021-03-18
Payer: MEDICAID

## 2021-03-18 DIAGNOSIS — Z98.890 OTHER SPECIFIED POSTPROCEDURAL STATES: Chronic | ICD-10-CM

## 2021-03-18 DIAGNOSIS — D50.9 IRON DEFICIENCY ANEMIA, UNSPECIFIED: ICD-10-CM

## 2021-03-18 DIAGNOSIS — Z83.79 FAMILY HISTORY OF OTHER DISEASES OF THE DIGESTIVE SYSTEM: Chronic | ICD-10-CM

## 2021-03-18 PROCEDURE — 74019 RADEX ABDOMEN 2 VIEWS: CPT

## 2021-03-18 PROCEDURE — 74019 RADEX ABDOMEN 2 VIEWS: CPT | Mod: 26

## 2021-04-17 ENCOUNTER — APPOINTMENT (OUTPATIENT)
Dept: DISASTER EMERGENCY | Facility: CLINIC | Age: 31
End: 2021-04-17

## 2021-04-18 LAB — SARS-COV-2 N GENE NPH QL NAA+PROBE: NOT DETECTED

## 2021-04-19 ENCOUNTER — TRANSCRIPTION ENCOUNTER (OUTPATIENT)
Age: 31
End: 2021-04-19

## 2021-04-20 ENCOUNTER — APPOINTMENT (OUTPATIENT)
Dept: GASTROENTEROLOGY | Facility: HOSPITAL | Age: 31
End: 2021-04-20

## 2021-04-20 ENCOUNTER — RESULT REVIEW (OUTPATIENT)
Age: 31
End: 2021-04-20

## 2021-04-20 ENCOUNTER — OUTPATIENT (OUTPATIENT)
Dept: OUTPATIENT SERVICES | Facility: HOSPITAL | Age: 31
LOS: 1 days | End: 2021-04-20
Payer: MEDICAID

## 2021-04-20 DIAGNOSIS — K22.10 ULCER OF ESOPHAGUS WITHOUT BLEEDING: ICD-10-CM

## 2021-04-20 DIAGNOSIS — Z83.79 FAMILY HISTORY OF OTHER DISEASES OF THE DIGESTIVE SYSTEM: Chronic | ICD-10-CM

## 2021-04-20 DIAGNOSIS — Z98.890 OTHER SPECIFIED POSTPROCEDURAL STATES: Chronic | ICD-10-CM

## 2021-04-20 LAB — HCG UR QL: NEGATIVE — SIGNIFICANT CHANGE UP

## 2021-04-20 PROCEDURE — 88313 SPECIAL STAINS GROUP 2: CPT

## 2021-04-20 PROCEDURE — 88312 SPECIAL STAINS GROUP 1: CPT

## 2021-04-20 PROCEDURE — 43239 EGD BIOPSY SINGLE/MULTIPLE: CPT

## 2021-04-20 PROCEDURE — 88312 SPECIAL STAINS GROUP 1: CPT | Mod: 26

## 2021-04-20 PROCEDURE — 88305 TISSUE EXAM BY PATHOLOGIST: CPT | Mod: 26

## 2021-04-20 PROCEDURE — 81025 URINE PREGNANCY TEST: CPT

## 2021-04-20 PROCEDURE — 88313 SPECIAL STAINS GROUP 2: CPT | Mod: 26

## 2021-04-20 PROCEDURE — 88305 TISSUE EXAM BY PATHOLOGIST: CPT

## 2021-04-23 ENCOUNTER — NON-APPOINTMENT (OUTPATIENT)
Age: 31
End: 2021-04-23

## 2021-04-28 ENCOUNTER — NON-APPOINTMENT (OUTPATIENT)
Age: 31
End: 2021-04-28

## 2021-06-04 ENCOUNTER — APPOINTMENT (OUTPATIENT)
Dept: GASTROENTEROLOGY | Facility: CLINIC | Age: 31
End: 2021-06-04
Payer: MEDICAID

## 2021-06-04 PROCEDURE — 99214 OFFICE O/P EST MOD 30 MIN: CPT | Mod: 95

## 2021-06-05 NOTE — HISTORY OF PRESENT ILLNESS
[de-identified] : 29 y/o woman with Hx of obesity s/p gastric sleeve (2016), lap hansa, , iron def anemia requiring iron infusions who was initially referred for iron def anemia who presents for followup of last endoscopy. \par \par \par Feels well.  \par She has been taking omeprazole twice a day - if she forgets to take it she will have burning pain.  \par Once a week she forget to take it. \par \par She says heavy menstrual flow has essened.  \par \par \par \par \par Initial visit 2020: \par In 2019 she was found to have H/H of 11.4/37.5. \par \par Patient was anemia during pregnancy, requiring iron infusion - last infusion almost a year ago. Was told that anemia was most likely from menorrhagia - still with heavy menses for three days. \par \par Stools are hard since gastric sleeve - when she strains she will see red blood in the toilet bowl. She has a movement every day, twice a day. Sometimes she has urgency and will have diarrhea. \par \par Taking omeprazole for acid reflux since the gastric sleeve - Takes omeprazole on a PRN bases. \par \par Last EGD was in 2018 - normal from what she recalls. \par \par Never had a colonoscopy. \par \par Maternal grandmother had stomach cancer. Patient denies family Hx of other GI cancers. Denies IBD in family. \par  \par \par \par Aug 2020 visit: \par 2020 the patient had a mild erosive esophagitis status post biopsy, small hiatal hernia, sleeve gastrectomy with antral nodule status post biopsy, mild antral erythema this was biopsied, normal duodenum status post biopsy to rule out villous atrophy as the cause of anemia.\par \par The patient had a colonoscopy on the same day and was found to have 8 mm sessile polyp in ascending colon status post cold snare polypectomy. Quality of the bowel prep was adequate for polyps greater than 6 mm. The colon polyp was a tubular adenoma.\par \par Duodenal biopsies were negative for celiac disease. 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 GE junction biopsies revealed esophagitis with eosinophils count of 11 hpf. \par \par Patient reports having irregular bowel habits - sometimes stools are stringy, pellets, sometimes loose, sometimes feels bloated, occasional nausea. Occasional heartburn especially after eating spicy foods. She denies vomiting. \par \par \par EGD 2021 showed linear erythema in striped fashion in the distal esophagus s/p bx, small antral nodule along the gastric sleeve anastomosis s/p bx, normal duodenum. Eventual repeat EGD. Antral nodule hyperplastic polyp negative for H. pylori. Esophagitis with rare intraepithelial eosinophils negative for intestinal metaplasia. \par \par \par \par Colonoscopy in 2021: Mild internal hemorrhoids, no polyps discovered. Repeat colonoscopy in 5 years. \par \par \par \par Telehealth visit on 2021: \par She has been taking omeprazole twice a day. Rarely with omeprazole twice she still gets heartburn. Without the PPI she gets heartburn all the time. \par \par No nausea, no vomiting.\par No melena and no hematochezia. \par  \par \par 2021 office visit: \par Has been taking omeprazole - if she does not take it, then regurgitation, burning in throat. No dysaphia, no nausea, no vomiting. \par \par \par Discussion/Summary 2021: \par Pain in left side of abdomen- feels tender - took laxative today 11 am - pain since yesterday was able to sleep last night. Right now pain is 3/10 on pain scale. No nausea, no vomiting. Felt dizzy today. Has had three bowel movement. 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 \par Advise follow up in office for capsule results. \par  \par \par \par Discussion/Summary 2021: \par Called patient and reviewed capsule endoscopy.  Small mid esophageal ulcer (white based). Gastric sleeve anatomy. Small bowel mucosa appearing normal. Cecum not reached.   Advised abdominal x-ray to r/o retained capsule.  Repeat capsule endoscopy vs. MR enterography discussed with patient - risks and benefits reviewed of both - she would like to arrange for a repeat capsule after results of abdominal x-ray.  Continue Omeprazole 40 mg twice a day - because o small ulcer in esophagus seen advised adding Pepcid at night. She will need repeat upper endoscopy after acid lowering mediations. \par \par \par Discussion/Summary 3/2021:\par Called patient and left message that xray normal. \par \par \par EGD on 2021:  Esophagitis with two erosions in distal esophagus s/p bx.   Antral nodule 1 cm s/p bx.  Sleeve gastrectomy.  Normal duodenum s/p bx.    Distal esophageal bx showed ulcer exudate.   Gastric bx negative for H. Pylori and IM.  Duodenal bx negative for celiac disease.

## 2021-06-05 NOTE — REASON FOR VISIT
[Home] : at home, [unfilled] , at the time of the visit. [Medical Office: ([facility])___] : at the medical office located in  [Verbal consent obtained from patient] : the patient, [unfilled]

## 2021-06-05 NOTE — ASSESSMENT
[FreeTextEntry1] : IMPRESSION\par #  Erosive Esophagitis \par -  EGD on 2020:  Mild erosive esophagitis (esophagitis with eosinophils count of 11 hpf), small hiatal hernia, gastrectomy with antral nodule s/p bx (suggestive of hamartomatous polyp).  Biopsies negative for celiac disease, IM, and HP.  \par -  EGD on 2021 Linear erythema in distal esophagus s/p bx (esophagitis with rare intraepithelial eosinophils negative for IM). Antral nodule s/p bx (hyperplastic polyp negative for H. pylori). \par -  EGD on 2021: Esophagitis with two erosions in distal esophagus s/p bx (ulcer exudate). Antral nodule 1 cm s/p bx (gastritis with foveolar hyperplasia). Sleeve gastrectomy. Normal duodenum s/p bx (neg for celiac). Gastric bx negative for H. Pylori and IM. \par \par #  History of Iron def anemia requiring iron infusions - initially referred to exclude GI causes, s/p only EGD/colonoscopy \par -  Capsule endoscopy in 3/2021: small esophageal ulcer, cecum not reached.  Discussed capsule endoscopy vs MR enterography - patient would like to arrange for a repeat capsule but did not arrange\par -  History of menorrhagia \par \par #  History of colon tubular adenoma\par Colonoscopy 2020:  8 mm sessile polyp in ascending colon s/p cold snare polypectomy.  Quality of bowel prep adequate for polyps greater than 6 mm, washing and suctioning performed.  \par -  Colonoscopy in 2021: Mild internal hemorrhoids, no polyps discovered. Repeat colonoscopy in 5 years. \par \par #  Multiple abdominal surgeries (s/p gastric sleeve (2016), lap hansa, )\par #  Maternal grandmother had stomach cancer \par \par \par PLAN: \par 1.  Will check CBC - if ongoing anemia then consider capsule endoscopy.  \par 2.  Continue PPI therapy - she has had ongoing erosions/ulcers of esophagus - side effects of long term PPI reviewed.  Eventual repeat EGD in 6 months discussed to confirm healing of erosions/ulcers of esophagus.  GERD diet has been reviewed previously.  \par 3.  Colonoscopy in 5 years for surveillance. \par \par  \par  \par \par

## 2021-06-07 ENCOUNTER — NON-APPOINTMENT (OUTPATIENT)
Age: 31
End: 2021-06-07

## 2021-06-07 LAB
ALBUMIN SERPL ELPH-MCNC: 4 G/DL
ALP BLD-CCNC: 77 U/L
ALT SERPL-CCNC: 9 U/L
ANION GAP SERPL CALC-SCNC: 8 MMOL/L
AST SERPL-CCNC: 15 U/L
BASOPHILS # BLD AUTO: 0.05 K/UL
BASOPHILS NFR BLD AUTO: 0.7 %
BILIRUB SERPL-MCNC: 0.3 MG/DL
BUN SERPL-MCNC: 12 MG/DL
CALCIUM SERPL-MCNC: 9.2 MG/DL
CHLORIDE SERPL-SCNC: 103 MMOL/L
CO2 SERPL-SCNC: 26 MMOL/L
CREAT SERPL-MCNC: 0.7 MG/DL
EOSINOPHIL # BLD AUTO: 0.12 K/UL
EOSINOPHIL NFR BLD AUTO: 1.6 %
FERRITIN SERPL-MCNC: 4 NG/ML
GLUCOSE SERPL-MCNC: 78 MG/DL
HCT VFR BLD CALC: 33.6 %
HGB BLD-MCNC: 9.5 G/DL
IMM GRANULOCYTES NFR BLD AUTO: 0.3 %
IRON SATN MFR SERPL: 5 %
IRON SERPL-MCNC: 20 UG/DL
LYMPHOCYTES # BLD AUTO: 2.91 K/UL
LYMPHOCYTES NFR BLD AUTO: 39.3 %
MAN DIFF?: NORMAL
MCHC RBC-ENTMCNC: 21.3 PG
MCHC RBC-ENTMCNC: 28.3 GM/DL
MCV RBC AUTO: 75.5 FL
MONOCYTES # BLD AUTO: 0.51 K/UL
MONOCYTES NFR BLD AUTO: 6.9 %
NEUTROPHILS # BLD AUTO: 3.8 K/UL
NEUTROPHILS NFR BLD AUTO: 51.2 %
PLATELET # BLD AUTO: 432 K/UL
POTASSIUM SERPL-SCNC: 4.3 MMOL/L
PROT SERPL-MCNC: 6.4 G/DL
RBC # BLD: 4.45 M/UL
RBC # FLD: 16.4 %
SODIUM SERPL-SCNC: 136 MMOL/L
TIBC SERPL-MCNC: 430 UG/DL
UIBC SERPL-MCNC: 410 UG/DL
WBC # FLD AUTO: 7.41 K/UL

## 2022-01-10 NOTE — ED ADULT NURSE NOTE - HARM RISK FACTORS
Ongoing SW/CM Assessment/Plan of Care Note     See SW/CM flowsheets for goals and other objective data.    Patient/Family discharge goal (s):  Goal #1: Communication facilitated  Goal #2: Education/provision of information (community resources)  Goal #3: Psychosocial needs assessed    PT Recommendation:  Recommendation for Discharge: PT WI: Sub-acute nursing home,Less intensive rehab       OT Recommendation:  Recommendations for Discharge: OT WI: 24 Hour assist,Sub-acute nursing home       SLP Recommendation:  Recommendations for Discharge: SLP: [facility]; post-acute ST    Disposition:  Planned Discharge Destination: Rehabilitation/Skilled Care    Progress note:   Verbal report from Dr Wisdom. Reports patient remains stable for discharge. Discharge pending placement. Currently no accepting facility. Spoke with patient regarding no accepting facility in [facility]. Patient agreeable to additional referrals to [facility] and [facility]". Referrals sent to [facility] [place], [place], [facility], [facility]     Contacted SO Matias at phone # [phone] providing updates with no accepting facility for rehab at this time. Matias is currently in hospital with pancreatitis and may require surgery per Matias.           no

## 2022-01-21 ENCOUNTER — APPOINTMENT (OUTPATIENT)
Dept: GASTROENTEROLOGY | Facility: CLINIC | Age: 32
End: 2022-01-21
Payer: MEDICAID

## 2022-01-21 VITALS
SYSTOLIC BLOOD PRESSURE: 115 MMHG | HEART RATE: 76 BPM | WEIGHT: 188 LBS | HEIGHT: 64 IN | DIASTOLIC BLOOD PRESSURE: 79 MMHG | OXYGEN SATURATION: 98 % | BODY MASS INDEX: 32.1 KG/M2

## 2022-01-21 PROCEDURE — 99214 OFFICE O/P EST MOD 30 MIN: CPT

## 2022-01-21 NOTE — HISTORY OF PRESENT ILLNESS
[de-identified] : 32 y/o woman with Hx of obesity s/p gastric sleeve (2016), lap hansa, , iron def anemia requiring iron infusions who was initially referred for iron def anemia who presents for followup.\par \par She has acid reflux - takes omeprazole 40 mg twice a day on most days - when she forgets to take it she will have more reflux symptoms - feels a discomfort in chest.  \par \par No dysphagia, no nausea, no vomiting. \par \par When having dairy she may have diarrhea - Lactaid pills help. \par \par \par \par \par \par Initial visit 2020: \par In 2019 she was found to have H/H of 11.4/37.5. \par \par Patient was anemia during pregnancy, requiring iron infusion - last infusion almost a year ago. Was told that anemia was most likely from menorrhagia - still with heavy menses for three days. \par \par Stools are hard since gastric sleeve - when she strains she will see red blood in the toilet bowl. She has a movement every day, twice a day. Sometimes she has urgency and will have diarrhea. \par \par Taking omeprazole for acid reflux since the gastric sleeve - Takes omeprazole on a PRN bases. \par \par Last EGD was in  - normal from what she recalls. \par \par Never had a colonoscopy. \par \par Maternal grandmother had stomach cancer. Patient denies family Hx of other GI cancers. Denies IBD in family. \par  \par \par \par Aug 2020 visit: \par 2020 the patient had a mild erosive esophagitis status post biopsy, small hiatal hernia, sleeve gastrectomy with antral nodule status post biopsy, mild antral erythema this was biopsied, normal duodenum status post biopsy to rule out villous atrophy as the cause of anemia.\par \par The patient had a colonoscopy on the same day and was found to have 8 mm sessile polyp in ascending colon status post cold snare polypectomy. Quality of the bowel prep was adequate for polyps greater than 6 mm. The colon polyp was a tubular adenoma.\par \par Duodenal biopsies were negative for celiac disease. 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 GE junction biopsies revealed esophagitis with eosinophils count of 11 hpf. \par \par Patient reports having irregular bowel habits - sometimes stools are stringy, pellets, sometimes loose, sometimes feels bloated, occasional nausea. Occasional heartburn especially after eating spicy foods. She denies vomiting. \par \par \par EGD 2021 showed linear erythema in striped fashion in the distal esophagus s/p bx, small antral nodule along the gastric sleeve anastomosis s/p bx, normal duodenum. Eventual repeat EGD. Antral nodule hyperplastic polyp negative for H. pylori. Esophagitis with rare intraepithelial eosinophils negative for intestinal metaplasia. \par \par \par \par Colonoscopy in 2021: Mild internal hemorrhoids, no polyps discovered. Repeat colonoscopy in 5 years. \par \par \par \par Telehealth visit on 2021: \par She has been taking omeprazole twice a day. Rarely with omeprazole twice she still gets heartburn. Without the PPI she gets heartburn all the time. \par \par No nausea, no vomiting.\par No melena and no hematochezia. \par  \par \par 2021 office visit: \par Has been taking omeprazole - if she does not take it, then regurgitation, burning in throat. No dysaphia, no nausea, no vomiting. \par \par \par Discussion/Summary 2021: \par Pain in left side of abdomen- feels tender - took laxative today 11 am - pain since yesterday was able to sleep last night. Right now pain is 3/10 on pain scale. No nausea, no vomiting. Felt dizzy today. Has had three bowel movement. 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 \par Advise follow up in office for capsule results. \par  \par \par \par Discussion/Summary 2021: \par Called patient and reviewed capsule endoscopy. Small mid esophageal ulcer (white based). Gastric sleeve anatomy. Small bowel mucosa appearing normal. Cecum not reached. Advised abdominal x-ray to r/o retained capsule. Repeat capsule endoscopy vs. MR enterography discussed with patient - risks and benefits reviewed of both - she would like to arrange for a repeat capsule after results of abdominal x-ray. Continue Omeprazole 40 mg twice a day - because o small ulcer in esophagus seen advised adding Pepcid at night. She will need repeat upper endoscopy after acid lowering mediations. \par \par \par Discussion/Summary 3/2021:\par Called patient and left message that xray normal. \par \par \par EGD on 2021: Esophagitis with two erosions in distal esophagus s/p bx. Antral nodule 1 cm s/p bx. Sleeve gastrectomy. Normal duodenum s/p bx. Distal esophageal bx showed ulcer exudate. Gastric bx negative for H. Pylori and IM. Duodenal bx negative for celiac disease. \par \par \par Office visit 2021:  \par \par Feels well. \par She has been taking omeprazole twice a day - if she forgets to take it she will have burning pain. \par Once a week she forget to take it. \par \par She says heavy menstrual flow has lessened. \par \par \par \par Discussion/Summary 2021:\par Called patient and reviewed recent blood work - H/H of /. Ferritin of 4 - oral iron twice a day with vitamin c advised - follow up with PCP - may need eventual iron infusions again. Patient reports having menorrhagia but no longer since past month- advised follow up with GYN. Discussed repeat capsule endoscopy - risks of procedure have been reviewed - she would like to arrange - she will call staff.

## 2022-01-21 NOTE — ASSESSMENT
[FreeTextEntry1] : IMPRESSION\par # History of Erosive Esophagitis - continues to rely on omeprazole 40 mg twice a day on most days since last upper endoscopy in 2021\par - EGD 2020: Mild erosive esophagitis (esophagitis with eosinophils count of 11 hpf), small hiatal hernia, gastrectomy with antral nodule s/p bx (suggestive of hamartomatous polyp). Biopsies negative for celiac disease, IM, and HP. \par - EGD 2021 Linear erythema in distal esophagus s/p bx (esophagitis with rare intraepithelial eosinophils negative for IM). Antral nodule s/p bx (hyperplastic polyp negative for H. pylori). \par - EGD 2021: Esophagitis with two erosions in distal esophagus s/p bx (ulcer exudate). Antral nodule 1 cm s/p bx (gastritis with foveolar hyperplasia). Sleeve gastrectomy. Normal duodenum s/p bx (neg for celiac). Gastric bx negative for H. Pylori and IM. \par \par # History of Iron def anemia requiring iron infusions - initially referred to exclude GI causes, s/p only EGD/colonoscopy, and incomplete capsule endoscopy  - last iron infusion a week ago.  blood work now normal - as follow up with hematologist.\par -  PHone discussion from 2021:Called patient and reviewed recent blood work - H/H of /. Ferritin of 4 - oral iron twice a day with vitamin c advised - follow up with PCP - may need eventual iron infusions again. Patient reports having menorrhagia but no longer since past month- advised follow up with GYN. Discussed repeat capsule endoscopy - risks of procedure have been reviewed - she would like to arrange - she will call staff. \par - Capsule endoscopy in 3/2021: small esophageal ulcer, cecum not reached. Discussed capsule endoscopy Vs MR enterography - patient would like to arrange for a repeat capsule but did not arrange\par - History of menorrhagia \par \par # History of colon tubular adenoma\par Colonoscopy 2020: 8 mm sessile polyp in ascending colon s/p cold snare polypectomy (tubular adenoma). Quality of bowel prep adequate for polyps greater than 6 mm, washing and suctioning performed. \par - Colonoscopy in 2021: Mild internal hemorrhoids, no polyps discovered. Repeat colonoscopy in 5 years. \par \par # Multiple abdominal surgeries (s/p gastric sleeve (2016), lap hansa, )\par # Maternal grandmother had stomach cancer \par \par # non-smoker - however recently lakhwinder  \par \par \par PLAN: \par I will plan for an upper endoscopy under monitored anesthesia care to rule out peptic ulcer disease, H.pylori gastritis etc.   Risks, benefits, and alternatives of the procedure were discussed with the patient. Patient understands and agrees to proceed with the planned procedure.\par \par Capsule endoscopy repeat to complete work up for anemia - r/o small bowel pathology - risks of obstruction reviewed - rare - alternatives discussed - she would like to proceed with capsule. \par \par Side effects of Omeprazole reviewed - however she has ongoing erosive esophagitis on multiple procedures - benefits reviewed.  \par \par Regarding reflux, life style changes discussed - GERD diet. \par \par Avoid Nicotine.  \par \par Follow up with hematologist. \par Follow up with GYN. \par \par Colonoscopy surveillance 5 years from .

## 2022-01-21 NOTE — PHYSICAL EXAM
[General Appearance - Alert] : alert [General Appearance - In No Acute Distress] : in no acute distress [Sclera] : the sclera and conjunctiva were normal [Extraocular Movements] : extraocular movements were intact [Outer Ear] : the ears and nose were normal in appearance [Hearing Threshold Finger Rub Not [place]] : hearing was normal [Neck Appearance] : the appearance of the neck was normal [Neck Cervical Mass (___cm)] : no neck mass was observed [Jugular Venous Distention Increased] : there was no jugular-venous distention [Thyroid Diffuse Enlargement] : the thyroid was not enlarged [Thyroid Nodule] : there were no palpable thyroid nodules [Auscultation Breath Sounds / Voice Sounds] : lungs were clear to auscultation bilaterally [Heart Rate And Rhythm] : heart rate was normal and rhythm regular [Heart Sounds] : normal S1 and S2 [Heart Sounds Gallop] : no gallops [Murmurs] : no murmurs [Heart Sounds Pericardial Friction Rub] : no pericardial rub [Bowel Sounds] : normal bowel sounds [Abdomen Soft] : soft [Abdomen Tenderness] : non-tender [Abdomen Mass (___ Cm)] : no abdominal mass palpated [Cervical Lymph Nodes Enlarged Posterior Bilaterally] : posterior cervical [Supraclavicular Lymph Nodes Enlarged Bilaterally] : supraclavicular [Abnormal Walk] : normal gait [Nail Clubbing] : no clubbing  or cyanosis of the fingernails [Skin Color & Pigmentation] : normal skin color and pigmentation [] : no rash [Oriented To Time, Place, And Person] : oriented to person, place, and time [Impaired Insight] : insight and judgment were intact [Affect] : the affect was normal

## 2022-02-04 ENCOUNTER — APPOINTMENT (OUTPATIENT)
Dept: GASTROENTEROLOGY | Facility: CLINIC | Age: 32
End: 2022-02-04
Payer: MEDICAID

## 2022-02-04 PROCEDURE — 91110 GI TRC IMG INTRAL ESOPH-ILE: CPT

## 2022-02-10 ENCOUNTER — NON-APPOINTMENT (OUTPATIENT)
Age: 32
End: 2022-02-10

## 2022-04-06 NOTE — ED ADULT NURSE NOTE - EXTENSIONS OF SELF_ADULT
None 5-Fu Pregnancy And Lactation Text: This medication is Pregnancy Category X and contraindicated in pregnancy and in women who may become pregnant. It is unknown if this medication is excreted in breast milk.

## 2022-06-10 ENCOUNTER — NON-APPOINTMENT (OUTPATIENT)
Age: 32
End: 2022-06-10

## 2022-06-15 ENCOUNTER — TRANSCRIPTION ENCOUNTER (OUTPATIENT)
Age: 32
End: 2022-06-15

## 2022-06-16 ENCOUNTER — APPOINTMENT (OUTPATIENT)
Dept: GASTROENTEROLOGY | Facility: HOSPITAL | Age: 32
End: 2022-06-16

## 2022-06-16 ENCOUNTER — RESULT REVIEW (OUTPATIENT)
Age: 32
End: 2022-06-16

## 2022-06-16 ENCOUNTER — OUTPATIENT (OUTPATIENT)
Dept: OUTPATIENT SERVICES | Facility: HOSPITAL | Age: 32
LOS: 1 days | End: 2022-06-16
Payer: MEDICAID

## 2022-06-16 DIAGNOSIS — Z83.79 FAMILY HISTORY OF OTHER DISEASES OF THE DIGESTIVE SYSTEM: Chronic | ICD-10-CM

## 2022-06-16 DIAGNOSIS — Z98.890 OTHER SPECIFIED POSTPROCEDURAL STATES: Chronic | ICD-10-CM

## 2022-06-16 DIAGNOSIS — K22.10 ULCER OF ESOPHAGUS WITHOUT BLEEDING: ICD-10-CM

## 2022-06-16 DIAGNOSIS — K21.9 GASTRO-ESOPHAGEAL REFLUX DISEASE WITHOUT ESOPHAGITIS: ICD-10-CM

## 2022-06-16 DIAGNOSIS — K20.90 ESOPHAGITIS, UNSPECIFIED WITHOUT BLEEDING: ICD-10-CM

## 2022-06-16 LAB — HCG UR QL: NEGATIVE — SIGNIFICANT CHANGE UP

## 2022-06-16 PROCEDURE — 81025 URINE PREGNANCY TEST: CPT

## 2022-06-16 PROCEDURE — 88342 IMHCHEM/IMCYTCHM 1ST ANTB: CPT | Mod: 26

## 2022-06-16 PROCEDURE — 88305 TISSUE EXAM BY PATHOLOGIST: CPT | Mod: 26

## 2022-06-16 PROCEDURE — 88342 IMHCHEM/IMCYTCHM 1ST ANTB: CPT

## 2022-06-16 PROCEDURE — 88312 SPECIAL STAINS GROUP 1: CPT | Mod: 26

## 2022-06-16 PROCEDURE — 88313 SPECIAL STAINS GROUP 2: CPT | Mod: 26

## 2022-06-16 PROCEDURE — 88312 SPECIAL STAINS GROUP 1: CPT

## 2022-06-16 PROCEDURE — 43239 EGD BIOPSY SINGLE/MULTIPLE: CPT

## 2022-06-16 PROCEDURE — 88305 TISSUE EXAM BY PATHOLOGIST: CPT

## 2022-06-16 PROCEDURE — 88313 SPECIAL STAINS GROUP 2: CPT

## 2022-06-16 RX ORDER — OMEPRAZOLE 40 MG/1
40 CAPSULE, DELAYED RELEASE ORAL
Qty: 30 | Refills: 3 | Status: ACTIVE | COMMUNITY
Start: 2022-06-16 | End: 1900-01-01

## 2022-06-23 ENCOUNTER — NON-APPOINTMENT (OUTPATIENT)
Age: 32
End: 2022-06-23

## 2022-08-02 ENCOUNTER — APPOINTMENT (OUTPATIENT)
Dept: GASTROENTEROLOGY | Facility: CLINIC | Age: 32
End: 2022-08-02

## 2022-08-02 VITALS
HEART RATE: 78 BPM | BODY MASS INDEX: 33.46 KG/M2 | OXYGEN SATURATION: 99 % | DIASTOLIC BLOOD PRESSURE: 82 MMHG | SYSTOLIC BLOOD PRESSURE: 116 MMHG | HEIGHT: 64 IN | WEIGHT: 196 LBS

## 2022-08-02 DIAGNOSIS — K21.9 GASTRO-ESOPHAGEAL REFLUX DISEASE W/OUT ESOPHAGITIS: ICD-10-CM

## 2022-08-02 PROCEDURE — 99214 OFFICE O/P EST MOD 30 MIN: CPT

## 2022-08-02 NOTE — ASSESSMENT
[FreeTextEntry1] : IMPRESSION:\par # History of Erosive Esophagitis - on omeprazole 40 mg once a day\par -   EGD by Dr. Shaffer on 2022:  Mild erosive esophagitis s/p bx (mild to moderate inflammation with scattered eosinophils negative for IM).  Small hiatal hernia.  Gastric sleeve anatomy with nodularity s/p bx (neg for IM and HP).  Normal duodenum. \par - EGD 2020: Mild erosive esophagitis (esophagitis with eosinophils count of 11 hpf), small hiatal hernia, gastrectomy with antral nodule s/p bx (suggestive of hamartomatous polyp). Biopsies negative for celiac disease, IM, and HP. \par - EGD 2021 Linear erythema in distal esophagus s/p bx (esophagitis with rare intraepithelial eosinophils negative for IM). Antral nodule s/p bx (hyperplastic polyp negative for H. pylori). \par - EGD 2021: Esophagitis with two erosions in distal esophagus s/p bx (ulcer exudate). Antral nodule 1 cm s/p bx (gastritis with foveolar hyperplasia). Sleeve gastrectomy. Normal duodenum s/p bx (neg for celiac). Gastric bx negative for H. Pylori and IM. \par \par # History of Iron def anemia requiring iron infusions \par -  Capsule endoscopy on 2022:  Nml visualized small bowel mucosa.  Cecum reached. \par -  Blood work on 2021:  H/H of ///6. Ferritin of 4 - advised follow up with may need iron infusions \par -  Patient reports having menorrhagia but no longer- advised follow up with GYN. \par -  Capsule endoscopy in 3/2021: small esophageal ulcer, cecum not reached. Discussed capsule endoscopy Vs MR enterography - patient would like to arrange for a repeat capsule but did not arrange\par \par # History of colon tubular adenoma\par -  Colonoscopy in 2021: Mild internal hemorrhoids, no polyps discovered. Repeat colonoscopy in 5 years. \par -  Colonoscopy 2020: 8 mm sessile polyp in ascending colon s/p cold snare polypectomy (tubular adenoma). Quality of bowel prep adequate for polyps greater than 6 mm, washing and suctioning performed. \par \par # Multiple abdominal surgeries (s/p gastric sleeve (2016), lap hansa, )\par # Maternal grandmother had stomach cancer \par \par \par PLAN: \par CBC with ferritin - Follow up with hematologist. \par Side effects of Omeprazole reviewed - however she has ongoing erosive esophagitis on multiple procedures - benefits reviewed. \par Regarding reflux, life style changes discussed - GERD diet. \par Follow up with GYN regarding menorrhagia. \par Colonoscopy surveillance 5 years from . \par Follow up in 6 months \par

## 2022-08-02 NOTE — HISTORY OF PRESENT ILLNESS
[de-identified] : 31 y/o woman with Hx of obesity s/p gastric sleeve (2016), lap hansa, , iron def anemia requiring iron infusions who was initially referred for iron def anemia who presents for followup after recent upper endoscopy. \par \par Says when she is taking omeprazole 40 mg once a day, she is feeling well - no reflux - only when she over eats, will she have regurgitation and need to vomit.  She has been avoiding spicy foods.  She has gained 10 lbs - says she feels the weight gain.  \par \par \par \par Initial visit 2020: \par In 2019 she was found to have H/H of 11.4/37.5. \par \par Patient was anemia during pregnancy, requiring iron infusion - last infusion almost a year ago. Was told that anemia was most likely from menorrhagia - still with heavy menses for three days. \par \par Stools are hard since gastric sleeve - when she strains she will see red blood in the toilet bowl. She has a movement every day, twice a day. Sometimes she has urgency and will have diarrhea. \par \par Taking omeprazole for acid reflux since the gastric sleeve - Takes omeprazole on a PRN bases. \par \par Last EGD was in 2018 - normal from what she recalls. \par \par Never had a colonoscopy. \par Maternal grandmother had stomach cancer. Patient denies family Hx of other GI cancers. Denies IBD in family. \par \par \par Aug 2020 visit: \par 2020 the patient had a mild erosive esophagitis status post biopsy, small hiatal hernia, sleeve gastrectomy with antral nodule status post biopsy, mild antral erythema this was biopsied, normal duodenum status post biopsy to rule out villous atrophy as the cause of anemia.\par \par The patient had a colonoscopy on the same day and was found to have 8 mm sessile polyp in ascending colon status post cold snare polypectomy. Quality of the bowel prep was adequate for polyps greater than 6 mm. The colon polyp was a tubular adenoma.\par \par Duodenal biopsies were negative for celiac disease. 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 GE junction biopsies revealed esophagitis with eosinophils count of 11 hpf. \par \par Patient reports having irregular bowel habits - sometimes stools are stringy, pellets, sometimes loose, sometimes feels bloated, occasional nausea. Occasional heartburn especially after eating spicy foods. She denies vomiting. \par \par EGD 2021 showed linear erythema in striped fashion in the distal esophagus s/p bx, small antral nodule along the gastric sleeve anastomosis s/p bx, normal duodenum. Eventual repeat EGD. Antral nodule hyperplastic polyp negative for H. pylori. Esophagitis with rare intraepithelial eosinophils negative for intestinal metaplasia. \par Colonoscopy in 2021: Mild internal hemorrhoids, no polyps discovered. Repeat colonoscopy in 5 years. \par \par \par Telehealth visit on 2021: \par She has been taking omeprazole twice a day. Rarely with omeprazole twice she still gets heartburn. Without the PPI she gets heartburn all the time. \par \par No nausea, no vomiting.No melena and no hematochezia. \par  \par 2021 office visit: \par Has been taking omeprazole - if she does not take it, then regurgitation, burning in throat. No dysaphia, no nausea, no vomiting. \par \par \par Discussion/Summary 2021: \par Pain in left side of abdomen- feels tender - took laxative today 11 am - pain since yesterday was able to sleep last night. Right now pain is 3/10 on pain scale. No nausea, no vomiting. Felt dizzy today. Has had three bowel movement. 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 \par Advise follow up in office for capsule results. \par  \par \par Discussion/Summary 2021: \par Called patient and reviewed capsule endoscopy. Small mid esophageal ulcer (white based). Gastric sleeve anatomy. Small bowel mucosa appearing normal. Cecum not reached. Advised abdominal x-ray to r/o retained capsule. Repeat capsule endoscopy vs. MR enterography discussed with patient - risks and benefits reviewed of both - she would like to arrange for a repeat capsule after results of abdominal x-ray. Continue Omeprazole 40 mg twice a day - because o small ulcer in esophagus seen advised adding Pepcid at night. She will need repeat upper endoscopy after acid lowering mediations. \par \par Discussion/Summary 3/2021:\par Called patient and left message that xray normal. \par \par EGD on 2021: Esophagitis with two erosions in distal esophagus s/p bx. Antral nodule 1 cm s/p bx. Sleeve gastrectomy. Normal duodenum s/p bx. Distal esophageal bx showed ulcer exudate. Gastric bx negative for H. Pylori and IM. Duodenal bx negative for celiac disease. \par \par \par Office visit 2021: \par Feels well. She has been taking omeprazole twice a day - if she forgets to take it she will have burning pain. Once a week she forget to take it. She says heavy menstrual flow has lessened. \par \par \par Discussion/Summary 2021:\par Called patient and reviewed recent blood work - H/H of . Ferritin of 4 - oral iron twice a day with vitamin c advised - follow up with PCP - may need eventual iron infusions again. Patient reports having menorrhagia but no longer since past month- advised follow up with GYN. Discussed repeat capsule endoscopy - risks of procedure have been reviewed - she would like to arrange - she will call staff. \par \par \par Office visit 2022: \par She has acid reflux - takes omeprazole 40 mg twice a day on most days - when she forgets to take it she will have more reflux symptoms - feels a discomfort in chest. No dysphagia, no nausea, no vomiting. When having dairy she may have diarrhea - Lactaid pills help. \par \par \par Discussion/Summary 2022: \par Capsule results: Esophagus not seen. Mild gastric erythema. Gastric polyps. Normal visualized small bowel. Cecum reached. Spoke with patient and results of capsule reviewed. \par \par

## 2022-08-02 NOTE — PHYSICAL EXAM
[General Appearance - Alert] : alert [General Appearance - In No Acute Distress] : in no acute distress [Sclera] : the sclera and conjunctiva were normal [Extraocular Movements] : extraocular movements were intact [Outer Ear] : the ears and nose were normal in appearance [Hearing Threshold Finger Rub Not [place]] : hearing was normal [Neck Appearance] : the appearance of the neck was normal [Neck Cervical Mass (___cm)] : no neck mass was observed [Auscultation Breath Sounds / Voice Sounds] : lungs were clear to auscultation bilaterally [Heart Rate And Rhythm] : heart rate was normal and rhythm regular [Heart Sounds] : normal S1 and S2 [Heart Sounds Gallop] : no gallops [Murmurs] : no murmurs [Heart Sounds Pericardial Friction Rub] : no pericardial rub [Bowel Sounds] : normal bowel sounds [Abdomen Soft] : soft [Abdomen Tenderness] : non-tender [Abdomen Mass (___ Cm)] : no abdominal mass palpated [Cervical Lymph Nodes Enlarged Posterior Bilaterally] : posterior cervical [Supraclavicular Lymph Nodes Enlarged Bilaterally] : supraclavicular [Abnormal Walk] : normal gait [Nail Clubbing] : no clubbing  or cyanosis of the fingernails [Skin Color & Pigmentation] : normal skin color and pigmentation [] : no rash [Oriented To Time, Place, And Person] : oriented to person, place, and time [Impaired Insight] : insight and judgment were intact [Affect] : the affect was normal

## 2022-08-03 ENCOUNTER — NON-APPOINTMENT (OUTPATIENT)
Age: 32
End: 2022-08-03

## 2022-08-03 LAB
BASOPHILS # BLD AUTO: 0.05 K/UL
BASOPHILS NFR BLD AUTO: 0.7 %
EOSINOPHIL # BLD AUTO: 0.23 K/UL
EOSINOPHIL NFR BLD AUTO: 3.1 %
FERRITIN SERPL-MCNC: 22 NG/ML
HCT VFR BLD CALC: 39.2 %
HGB BLD-MCNC: 12.8 G/DL
IMM GRANULOCYTES NFR BLD AUTO: 0.4 %
LYMPHOCYTES # BLD AUTO: 1.9 K/UL
LYMPHOCYTES NFR BLD AUTO: 25.9 %
MAN DIFF?: NORMAL
MCHC RBC-ENTMCNC: 28.2 PG
MCHC RBC-ENTMCNC: 32.7 GM/DL
MCV RBC AUTO: 86.3 FL
MONOCYTES # BLD AUTO: 0.44 K/UL
MONOCYTES NFR BLD AUTO: 6 %
NEUTROPHILS # BLD AUTO: 4.68 K/UL
NEUTROPHILS NFR BLD AUTO: 63.9 %
PLATELET # BLD AUTO: 348 K/UL
RBC # BLD: 4.54 M/UL
RBC # FLD: 13 %
WBC # FLD AUTO: 7.33 K/UL

## 2023-01-29 ENCOUNTER — NON-APPOINTMENT (OUTPATIENT)
Age: 33
End: 2023-01-29

## 2023-02-01 ENCOUNTER — APPOINTMENT (OUTPATIENT)
Dept: DERMATOLOGY | Facility: CLINIC | Age: 33
End: 2023-02-01
Payer: MEDICAID

## 2023-02-01 PROCEDURE — 99213 OFFICE O/P EST LOW 20 MIN: CPT

## 2023-02-23 ENCOUNTER — APPOINTMENT (OUTPATIENT)
Dept: DERMATOLOGY | Facility: CLINIC | Age: 33
End: 2023-02-23
Payer: MEDICAID

## 2023-02-23 ENCOUNTER — NON-APPOINTMENT (OUTPATIENT)
Age: 33
End: 2023-02-23

## 2023-02-23 DIAGNOSIS — L72.11 PILAR CYST: ICD-10-CM

## 2023-02-23 PROCEDURE — 99214 OFFICE O/P EST MOD 30 MIN: CPT

## 2023-03-23 ENCOUNTER — APPOINTMENT (OUTPATIENT)
Dept: DERMATOLOGY | Facility: CLINIC | Age: 33
End: 2023-03-23

## 2023-03-23 ENCOUNTER — APPOINTMENT (OUTPATIENT)
Dept: GASTROENTEROLOGY | Facility: CLINIC | Age: 33
End: 2023-03-23
Payer: MEDICAID

## 2023-03-23 VITALS
HEART RATE: 62 BPM | BODY MASS INDEX: 32.61 KG/M2 | WEIGHT: 191 LBS | SYSTOLIC BLOOD PRESSURE: 134 MMHG | DIASTOLIC BLOOD PRESSURE: 52 MMHG | HEIGHT: 64 IN | OXYGEN SATURATION: 100 %

## 2023-03-23 DIAGNOSIS — R19.8 OTHER SPECIFIED SYMPTOMS AND SIGNS INVOLVING THE DIGESTIVE SYSTEM AND ABDOMEN: ICD-10-CM

## 2023-03-23 PROCEDURE — 99214 OFFICE O/P EST MOD 30 MIN: CPT

## 2023-03-23 RX ORDER — SODIUM SULFATE, POTASSIUM SULFATE, MAGNESIUM SULFATE 17.5; 3.13; 1.6 G/ML; G/ML; G/ML
17.5-3.13-1.6 SOLUTION, CONCENTRATE ORAL
Qty: 1 | Refills: 0 | Status: DISCONTINUED | COMMUNITY
Start: 2020-02-12 | End: 2023-03-23

## 2023-03-23 RX ORDER — SODIUM SULFATE, POTASSIUM SULFATE, MAGNESIUM SULFATE 17.5; 3.13; 1.6 G/ML; G/ML; G/ML
17.5-3.13-1.6 SOLUTION, CONCENTRATE ORAL
Qty: 1 | Refills: 0 | Status: DISCONTINUED | COMMUNITY
Start: 2020-08-03 | End: 2023-03-23

## 2023-03-23 RX ORDER — FAMOTIDINE 40 MG/1
40 TABLET, FILM COATED ORAL
Qty: 30 | Refills: 2 | Status: DISCONTINUED | COMMUNITY
Start: 2021-03-12 | End: 2023-03-23

## 2023-03-23 RX ORDER — OMEPRAZOLE 40 MG/1
40 CAPSULE, DELAYED RELEASE ORAL
Qty: 30 | Refills: 3 | Status: DISCONTINUED | COMMUNITY
Start: 2020-08-03 | End: 2023-03-23

## 2023-03-23 RX ORDER — CLINDAMYCIN PHOSPHATE 10 MG/ML
1 SOLUTION TOPICAL
Qty: 1 | Refills: 3 | Status: DISCONTINUED | COMMUNITY
Start: 2020-11-13 | End: 2023-03-23

## 2023-03-23 RX ORDER — OMEPRAZOLE 40 MG/1
40 CAPSULE, DELAYED RELEASE ORAL
Qty: 60 | Refills: 3 | Status: DISCONTINUED | COMMUNITY
Start: 2021-01-07 | End: 2023-03-23

## 2023-03-23 RX ORDER — OMEPRAZOLE 40 MG/1
40 CAPSULE, DELAYED RELEASE ORAL
Qty: 30 | Refills: 3 | Status: DISCONTINUED | COMMUNITY
Start: 2020-06-18 | End: 2023-03-23

## 2023-03-23 RX ORDER — OMEPRAZOLE 20 MG/1
TABLET, DELAYED RELEASE ORAL
Refills: 0 | Status: DISCONTINUED | COMMUNITY
End: 2023-03-23

## 2023-03-23 RX ORDER — DOXYCYCLINE HYCLATE 100 MG/1
100 CAPSULE ORAL TWICE DAILY
Qty: 28 | Refills: 0 | Status: DISCONTINUED | COMMUNITY
Start: 2023-02-23 | End: 2023-03-23

## 2023-03-23 RX ORDER — OMEPRAZOLE 40 MG/1
40 CAPSULE, DELAYED RELEASE ORAL
Qty: 90 | Refills: 3 | Status: ACTIVE | COMMUNITY
Start: 2023-03-23 | End: 1900-01-01

## 2023-03-23 RX ORDER — PRENATAL VIT 49/IRON FUM/FOLIC 6.75-0.2MG
TABLET ORAL
Refills: 0 | Status: DISCONTINUED | COMMUNITY
End: 2023-03-23

## 2023-03-23 RX ORDER — POLYETHYLENE GLYCOL-3350 AND ELECTROLYTES WITH FLAVOR PACK 240; 5.84; 2.98; 6.72; 22.72 G/278.26G; G/278.26G; G/278.26G; G/278.26G; G/278.26G
240 POWDER, FOR SOLUTION ORAL
Qty: 1 | Refills: 0 | Status: DISCONTINUED | COMMUNITY
Start: 2021-01-04 | End: 2023-03-23

## 2023-03-23 RX ORDER — OMEPRAZOLE 40 MG/1
40 CAPSULE, DELAYED RELEASE ORAL
Qty: 30 | Refills: 3 | Status: DISCONTINUED | COMMUNITY
Start: 2022-02-11 | End: 2023-03-23

## 2023-03-23 NOTE — PHYSICAL EXAM
[Abdomen Tenderness] : non-tender [No Masses] : no abdominal mass palpated [Abdomen Soft] : soft [] : no hepatosplenomegaly [Cervical Lymph Nodes Enlarged Posterior Bilaterally] : no posterior cervical lymphadenopathy [Abnormal Walk] : normal gait [Supraclavicular Lymph Nodes Enlarged Bilaterally] : no supraclavicular lymphadenopathy [No Clubbing, Cyanosis] : no clubbing or cyanosis of the fingernails [Normal] : oriented to person, place, and time

## 2023-03-23 NOTE — HISTORY OF PRESENT ILLNESS
[FreeTextEntry1] : 31 y/o woman with Hx of obesity s/p gastric sleeve (2016), lap hansa, , iron def anemia requiring iron infusions who was initially referred for iron def anemia who presents for followup for reflux. \par \par Says she has heartburn if she does not take omeprazole -would like to see surgeon for revision of gastric sleeve.  \par In 2023 watery diarrhea for 4 days - felt dizzy - resolved.  Now has diarrhea twice a week - has bowel urgency. Says lost weight.   Says is going to GYM since 2022 - eating more healthier.\par Her grandmother had cholera in 2022. \par \par \par Initial visit 2020: \par In 2019 she was found to have H/H of 11.4/37.5. \par \par Patient was anemia during pregnancy, requiring iron infusion - last infusion almost a year ago. Was told that anemia was most likely from menorrhagia - still with heavy menses for three days. \par \par Stools are hard since gastric sleeve - when she strains she will see red blood in the toilet bowl. She has a movement every day, twice a day. Sometimes she has urgency and will have diarrhea. \par \par Taking omeprazole for acid reflux since the gastric sleeve - Takes omeprazole on a PRN bases. \par \par Last EGD was in 2018 - normal from what she recalls. \par \par Never had a colonoscopy. \par Maternal grandmother had stomach cancer. Patient denies family Hx of other GI cancers. Denies IBD in family. \par \par \par Aug 2020 visit: \par 2020 the patient had a mild erosive esophagitis status post biopsy, small hiatal hernia, sleeve gastrectomy with antral nodule status post biopsy, mild antral erythema this was biopsied, normal duodenum status post biopsy to rule out villous atrophy as the cause of anemia.\par \par The patient had a colonoscopy on the same day and was found to have 8 mm sessile polyp in ascending colon status post cold snare polypectomy. Quality of the bowel prep was adequate for polyps greater than 6 mm. The colon polyp was a tubular adenoma.\par \par Duodenal biopsies were negative for celiac disease. 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 GE junction biopsies revealed esophagitis with eosinophils count of 11 hpf. \par \par Patient reports having irregular bowel habits - sometimes stools are stringy, pellets, sometimes loose, sometimes feels bloated, occasional nausea. Occasional heartburn especially after eating spicy foods. She denies vomiting. \par \par EGD 2021 showed linear erythema in striped fashion in the distal esophagus s/p bx, small antral nodule along the gastric sleeve anastomosis s/p bx, normal duodenum. Eventual repeat EGD. Antral nodule hyperplastic polyp negative for H. pylori. Esophagitis with rare intraepithelial eosinophils negative for intestinal metaplasia. \par Colonoscopy in 2021: Mild internal hemorrhoids, no polyps discovered. Repeat colonoscopy in 5 years. \par \par \par Telehealth visit on 2021: \par She has been taking omeprazole twice a day. Rarely with omeprazole twice she still gets heartburn. Without the PPI she gets heartburn all the time. \par \par No nausea, no vomiting.No melena and no hematochezia. \par  \par 2021 office visit: \par Has been taking omeprazole - if she does not take it, then regurgitation, burning in throat. No dysaphia, no nausea, no vomiting. \par \par \par Discussion/Summary 2021: \par Pain in left side of abdomen- feels tender - took laxative today 11 am - pain since yesterday was able to sleep last night. Right now pain is 3/10 on pain scale. No nausea, no vomiting. Felt dizzy today. Has had three bowel movement. 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 \par Advise follow up in office for capsule results. \par  \par \par Discussion/Summary 2021: \par Called patient and reviewed capsule endoscopy. Small mid esophageal ulcer (white based). Gastric sleeve anatomy. Small bowel mucosa appearing normal. Cecum not reached. Advised abdominal x-ray to r/o retained capsule. Repeat capsule endoscopy vs. MR enterography discussed with patient - risks and benefits reviewed of both - she would like to arrange for a repeat capsule after results of abdominal x-ray. Continue Omeprazole 40 mg twice a day - because o small ulcer in esophagus seen advised adding Pepcid at night. She will need repeat upper endoscopy after acid lowering mediations. \par \par Discussion/Summary 3/2021:\par Called patient and left message that xray normal. \par \par EGD on 2021: Esophagitis with two erosions in distal esophagus s/p bx. Antral nodule 1 cm s/p bx. Sleeve gastrectomy. Normal duodenum s/p bx. Distal esophageal bx showed ulcer exudate. Gastric bx negative for H. Pylori and IM. Duodenal bx negative for celiac disease. \par \par \par Office visit 2021: \par Feels well. She has been taking omeprazole twice a day - if she forgets to take it she will have burning pain. Once a week she forget to take it. She says heavy menstrual flow has lessened. \par \par \par Discussion/Summary 2021:\par Called patient and reviewed recent blood work - H/H of . Ferritin of 4 - oral iron twice a day with vitamin c advised - follow up with PCP - may need eventual iron infusions again. Patient reports having menorrhagia but no longer since past month- advised follow up with GYN. Discussed repeat capsule endoscopy - risks of procedure have been reviewed - she would like to arrange - she will call staff. \par \par \par Office visit 2022: \par She has acid reflux - takes omeprazole 40 mg twice a day on most days - when she forgets to take it she will have more reflux symptoms - feels a discomfort in chest. No dysphagia, no nausea, no vomiting. When having dairy she may have diarrhea - Lactaid pills help. \par \par \par Discussion/Summary 2022: \par Capsule results: Esophagus not seen. Mild gastric erythema. Gastric polyps. Normal visualized small bowel. Cecum reached. Spoke with patient and results of capsule reviewed. \par \par \par \par Office visit 2022: \par Says when she is taking omeprazole 40 mg once a day, she is feeling well - no reflux - only when she over eats, will she have regurgitation and need to vomit. She has been avoiding spicy foods. She has gained 10 lbs - says she feels the weight gain.

## 2023-03-23 NOTE — ASSESSMENT
[FreeTextEntry1] : IMPRESSION:\par # History of Erosive Esophagitis - on omeprazole 40 mg once a day\par -  EGD by Dr. Shaffer on 2022: Mild erosive esophagitis s/p bx (mild to moderate inflammation with scattered eosinophils negative for IM). Small hiatal hernia. Gastric sleeve anatomy with nodularity s/p bx (neg for IM and HP). Normal duodenum. \par -  EGD 2021: Esophagitis with two erosions in distal esophagus s/p bx (ulcer exudate). Antral nodule 1 cm s/p bx (gastritis with foveolar hyperplasia). Sleeve gastrectomy. Normal duodenum s/p bx (neg for celiac). Gastric bx negative for H. Pylori and IM. \par -  EGD 2021 Linear erythema in distal esophagus s/p bx (esophagitis with rare intraepithelial eosinophils negative for IM). Antral nodule s/p bx (hyperplastic polyp negative for H. pylori). \par -  EGD 2020: Mild erosive esophagitis (esophagitis with eosinophils count of 11 hpf), small hiatal hernia, gastrectomy with antral nodule s/p bx (suggestive of hamartomatous polyp). Biopsies negative for celiac disease, IM, and HP. \par \par # History of Iron def anemia requiring iron infusions \par -  CBC and ferritin normal 2022\par -  Capsule endoscopy on 2022: Nml visualized small bowel mucosa. Cecum reached. \par -  Patient reports having menorrhagia but no longer- advised follow up with GYN. \par -  Capsule endoscopy in 3/2021: small esophageal ulcer, cecum not reached. Discussed capsule endoscopy Vs MR enterography - patient would like to arrange for a repeat capsule but did not arrange\par \par # History of colon tubular adenoma\par -  Colonoscopy in 2021: Mild internal hemorrhoids, no polyps discovered. Repeat colonoscopy in 5 years. \par -  Colonoscopy 2020: 8 mm sessile polyp in ascending colon s/p cold snare polypectomy (tubular adenoma). Quality of bowel prep adequate for polyps greater than 6 mm, washing and suctioning performed. \par \par #  Multiple abdominal surgeries (s/p gastric sleeve (2016), lap hansa, )\par #  Maternal grandmother had stomach cancer \par \par \par PLAN:\par Continue PPI therapy.\par She is considering revision for Gastric sleeve - contact information given. \par Regarding reflux, life style changes discussed - GERD diet.  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  Avoid dairy - follow up in the office. \par Colonoscopy surveillance 5 years from . \par \par

## 2023-09-22 PROBLEM — D50.9 ANEMIA, IRON DEFICIENCY: Status: ACTIVE | Noted: 2020-08-03

## 2023-09-22 PROBLEM — K22.10 EROSIVE ESOPHAGITIS: Status: ACTIVE | Noted: 2020-06-18

## 2023-09-27 ENCOUNTER — APPOINTMENT (OUTPATIENT)
Dept: GASTROENTEROLOGY | Facility: CLINIC | Age: 33
End: 2023-09-27

## 2023-09-27 DIAGNOSIS — K22.10 ULCER OF ESOPHAGUS W/OUT BLEEDING: ICD-10-CM

## 2023-09-27 DIAGNOSIS — D50.9 IRON DEFICIENCY ANEMIA, UNSPECIFIED: ICD-10-CM

## 2023-12-28 NOTE — HISTORY OF PRESENT ILLNESS
[FreeTextEntry1] : 34 y/o woman with Hx of obesity s/p gastric sleeve (2016), lap hansa, , iron def anemia requiring iron infusions who was initially referred for iron def anemia who presents for followup for reflux.     Initial visit 2020: In 2019 she was found to have H/H of 11.4/37.5.  Patient was anemia during pregnancy, requiring iron infusion - last infusion almost a year ago. Was told that anemia was most likely from menorrhagia - still with heavy menses for three days. Stools are hard since gastric sleeve - when she strains she will see red blood in the toilet bowl. She has a movement every day, twice a day. Sometimes she has urgency and will have diarrhea. Taking omeprazole for acid reflux since the gastric sleeve - Takes omeprazole on a PRN bases. Last EGD was in  - normal from what she recalls. Never had a colonoscopy. Maternal grandmother had stomach cancer. Patient denies family Hx of other GI cancers. Denies IBD in family.    Aug 2020 visit: 2020 the patient had a mild erosive esophagitis status post biopsy, small hiatal hernia, sleeve gastrectomy with antral nodule status post biopsy, mild antral erythema this was biopsied, normal duodenum status post biopsy to rule out villous atrophy as the cause of anemia.   The patient had a colonoscopy on the same day and was found to have 8 mm sessile polyp in ascending colon status post cold snare polypectomy. Quality of the bowel prep was adequate for polyps greater than 6 mm. The colon polyp was a tubular adenoma.  Duodenal biopsies were negative for celiac disease. 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 GE junction biopsies revealed esophagitis with eosinophils count of 11 hpf.   Patient reports having irregular bowel habits - sometimes stools are stringy, pellets, sometimes loose, sometimes feels bloated, occasional nausea. Occasional heartburn especially after eating spicy foods. She denies vomiting.   EGD 2021 showed linear erythema in striped fashion in the distal esophagus s/p bx, small antral nodule along the gastric sleeve anastomosis s/p bx, normal duodenum. Eventual repeat EGD. Antral nodule hyperplastic polyp negative for H. pylori. Esophagitis with rare intraepithelial eosinophils negative for intestinal metaplasia.  Colonoscopy in 2021: Mild internal hemorrhoids, no polyps discovered. Repeat colonoscopy in 5 years.   Telehealth visit on 2021: She has been taking omeprazole twice a day. Rarely with omeprazole twice she still gets heartburn. Without the PPI she gets heartburn all the time. No nausea, no vomiting.No melena and no hematochezia.  2021 office visit: Has been taking omeprazole - if she does not take it, then regurgitation, burning in throat. No dysaphia, no nausea, no vomiting.   Discussion/Summary 2021: Pain in left side of abdomen- feels tender - took laxative today 11 am - pain since yesterday was able to sleep last night. Right now pain is 3/10 on pain scale. No nausea, no vomiting. Felt dizzy today. Has had three bowel movement. 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 Advise ER visit if ongoing pain to r/o obstruction. Advise follow up in office for capsule results.    Discussion/Summary 2021: Called patient and reviewed capsule endoscopy. Small mid esophageal ulcer (white based). Gastric sleeve anatomy. Small bowel mucosa appearing normal. Cecum not reached. Advised abdominal x-ray to r/o retained capsule. Repeat capsule endoscopy vs. MR enterography discussed with patient - risks and benefits reviewed of both - she would like to arrange for a repeat capsule after results of abdominal x-ray. Continue Omeprazole 40 mg twice a day - because o small ulcer in esophagus seen advised adding Pepcid at night. She will need repeat upper endoscopy after acid lowering mediations.   Discussion/Summary 3/2021: Called patient and left message that xray normal.  EGD on 2021: Esophagitis with two erosions in distal esophagus s/p bx. Antral nodule 1 cm s/p bx. Sleeve gastrectomy. Normal duodenum s/p bx. Distal esophageal bx showed ulcer exudate. Gastric bx negative for H. Pylori and IM. Duodenal bx negative for celiac disease.    Office visit 2021: Feels well. She has been taking omeprazole twice a day - if she forgets to take it she will have burning pain. Once a week she forget to take it. She says heavy menstrual flow has lessened.    Discussion/Summary 2021: Called patient and reviewed recent blood work - H/H of /. Ferritin of 4 - oral iron twice a day with vitamin c advised - follow up with PCP - may need eventual iron infusions again. Patient reports having menorrhagia but no longer since past month- advised follow up with GYN. Discussed repeat capsule endoscopy - risks of procedure have been reviewed - she would like to arrange - she will call staff.    Office visit 2022: She has acid reflux - takes omeprazole 40 mg twice a day on most days - when she forgets to take it she will have more reflux symptoms - feels a discomfort in chest. No dysphagia, no nausea, no vomiting. When having dairy she may have diarrhea - Lactaid pills help.   Discussion/Summary 2022: Capsule results: Esophagus not seen. Mild gastric erythema. Gastric polyps. Normal visualized small bowel. Cecum reached. Spoke with patient and results of capsule reviewed.   Office visit 2022: Says when she is taking omeprazole 40 mg once a day, she is feeling well - no reflux - only when she over eats, will she have regurgitation and need to vomit. She has been avoiding spicy foods. She has gained 10 lbs - says she feels the weight gain.     Office visit 3/2023:  Says she has heartburn if she does not take omeprazole -would like to see surgeon for revision of gastric sleeve. In 2023 watery diarrhea for 4 days - felt dizzy - resolved. Now has diarrhea twice a week - has bowel urgency. Says lost weight. Says is going to GYM since 2022 - eating more healthier. Her grandmother had cholera in 2022.

## 2024-01-01 NOTE — ED ADULT NURSE NOTE - [id number]
No Right ear hearing screen completed date: 2024  Right ear screen method: EOAE (evoked otoacoustic emission)  Right ear screen result: Passed  Right ear screen comment: N/A    Left ear hearing screen completed date: 2024  Left ear screen method: EOAE (evoked otoacoustic emission)  Left ear screen result: Passed  Left ear screen comments: N/A

## 2024-01-02 ENCOUNTER — APPOINTMENT (OUTPATIENT)
Dept: GASTROENTEROLOGY | Facility: CLINIC | Age: 34
End: 2024-01-02

## 2024-04-18 ENCOUNTER — APPOINTMENT (OUTPATIENT)
Dept: GASTROENTEROLOGY | Facility: CLINIC | Age: 34
End: 2024-04-18
Payer: COMMERCIAL

## 2024-04-18 VITALS
OXYGEN SATURATION: 100 % | WEIGHT: 180 LBS | HEART RATE: 76 BPM | BODY MASS INDEX: 30.73 KG/M2 | DIASTOLIC BLOOD PRESSURE: 58 MMHG | HEIGHT: 64 IN | SYSTOLIC BLOOD PRESSURE: 108 MMHG

## 2024-04-18 DIAGNOSIS — R12 HEARTBURN: ICD-10-CM

## 2024-04-18 PROCEDURE — 99214 OFFICE O/P EST MOD 30 MIN: CPT

## 2024-04-18 NOTE — HISTORY OF PRESENT ILLNESS
[FreeTextEntry1] : 32 y/o woman with Hx of obesity s/p gastric sleeve (2016), lap hansa, , iron def anemia requiring iron infusions who was initially referred for iron def anemia who presents for follow-up of reflux symptoms.   Since the Summer of , when eating she has noticed tightness in chest - burning discomfort in chest - has to take tums.  Has to take omeprazole as needed.   Patient denies having difficulty swallowing, painful swallowing, nausea, vomiting, loss of appetite, weight loss, melena and hematochezia.  Maintaining weight.   Says stools are hard since starting infusions last month - last infusion yesterday.  Says she had likely anal fissure two months after straining a bowel moment.    Still iron infusions - second infusion yesterday.   Says only heavy periods for two days - has seen GYN      Initial visit 2020: In 2019 she was found to have H/H of 11.4/37.5.   Patient was anemia during pregnancy, requiring iron infusion - last infusion almost a year ago. Was told that anemia was most likely from menorrhagia - still with heavy menses for three days. Stools are hard since gastric sleeve - when she strains she will see red blood in the toilet bowl. She has a movement every day, twice a day. Sometimes she has urgency and will have diarrhea.  Taking omeprazole for acid reflux since the gastric sleeve - Takes omeprazole on a PRN bases. Last EGD was in  - normal from what she recalls.  Never had a colonoscopy. Maternal grandmother had stomach cancer. Patient denies family Hx of other GI cancers. Denies IBD in family.   Aug 2020 visit: 2020 the patient had a mild erosive esophagitis status post biopsy, small hiatal hernia, sleeve gastrectomy with antral nodule status post biopsy, mild antral erythema this was biopsied, normal duodenum status post biopsy to rule out villous atrophy as the cause of anemia.  The patient had a colonoscopy on the same day and was found to have 8 mm sessile polyp in ascending colon status post cold snare polypectomy. Quality of the bowel prep was adequate for polyps greater than 6 mm. The colon polyp was a tubular adenoma.  Duodenal biopsies were negative for celiac disease. 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 GE junction biopsies revealed esophagitis with eosinophils count of 11 hpf. Patient reports having irregular bowel habits - sometimes stools are stringy, pellets, sometimes loose, sometimes feels bloated, occasional nausea. Occasional heartburn especially after eating spicy foods. She denies vomiting.  EGD 2021 showed linear erythema in striped fashion in the distal esophagus s/p bx, small antral nodule along the gastric sleeve anastomosis s/p bx, normal duodenum. Eventual repeat EGD. Antral nodule hyperplastic polyp negative for H. pylori. Esophagitis with rare intraepithelial eosinophils negative for intestinal metaplasia.  Colonoscopy in 2021: Mild internal hemorrhoids, no polyps discovered. Repeat colonoscopy in 5 years.   Telehealth visit on 2021: She has been taking omeprazole twice a day. Rarely with omeprazole twice she still gets heartburn. Without the PPI she gets heartburn all the time.   No nausea, no vomiting.No melena and no hematochezia.    2021 office visit: Has been taking omeprazole - if she does not take it, then regurgitation, burning in throat. No dysaphia, no nausea, no vomiting.    Discussion/Summary 2021: Pain in left side of abdomen- feels tender - took laxative today 11 am - pain since yesterday was able to sleep last night. Right now pain is 3/10 on pain scale. No nausea, no vomiting. Felt dizzy today. Has had three bowel movement.  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   Advise ER visit if ongoing pain to r/o obstruction. Advise follow up in office for capsule results.   Discussion/Summary 2021: Called patient and reviewed capsule endoscopy. Small mid esophageal ulcer (white based). Gastric sleeve anatomy. Small bowel mucosa appearing normal. Cecum not reached. Advised abdominal x-ray to r/o retained capsule. Repeat capsule endoscopy vs. MR enterography discussed with patient - risks and benefits reviewed of both - she would like to arrange for a repeat capsule after results of abdominal x-ray. Continue Omeprazole 40 mg twice a day - because o small ulcer in esophagus seen advised adding Pepcid at night. She will need repeat upper endoscopy after acid lowering mediations.   Discussion/Summary 3/2021: Called patient and left message that xray normal.  EGD on 2021: Esophagitis with two erosions in distal esophagus s/p bx. Antral nodule 1 cm s/p bx. Sleeve gastrectomy. Normal duodenum s/p bx. Distal esophageal bx showed ulcer exudate. Gastric bx negative for H. Pylori and IM. Duodenal bx negative for celiac disease.   Office visit 2021: Feels well. She has been taking omeprazole twice a day - if she forgets to take it she will have burning pain. Once a week she forget to take it. She says heavy menstrual flow has lessened.    Discussion/Summary 2021: Called patient and reviewed recent blood work - H/H of /. Ferritin of 4 - oral iron twice a day with vitamin c advised - follow up with PCP - may need eventual iron infusions again. Patient reports having menorrhagia but no longer since past month- advised follow up with GYN. Discussed repeat capsule endoscopy - risks of procedure have been reviewed - she would like to arrange - she will call staff.   Office visit 2022: She has acid reflux - takes omeprazole 40 mg twice a day on most days - when she forgets to take it she will have more reflux symptoms - feels a discomfort in chest. No dysphagia, no nausea, no vomiting. When having dairy she may have diarrhea - Lactaid pills help.    Discussion/Summary 2022: Capsule results: Esophagus not seen. Mild gastric erythema. Gastric polyps. Normal visualized small bowel. Cecum reached. Spoke with patient and results of capsule reviewed.    Office visit 2022: Says when she is taking omeprazole 40 mg once a day, she is feeling well - no reflux - only when she over eats, will she have regurgitation and need to vomit. She has been avoiding spicy foods. She has gained 10 lbs - says she feels the weight gain.    Office visit 3/2023:  Says she has heartburn if she does not take omeprazole -would like to see surgeon for revision of gastric sleeve.  In 2023 watery diarrhea for 4 days - felt dizzy - resolved. Now has diarrhea twice a week - has bowel urgency. Says lost weight. Says is going to GYM since 2022 - eating more healthier.  Her grandmother had cholera in 2022.

## 2024-04-18 NOTE — PHYSICAL EXAM
[Bowel Sounds] : normal bowel sounds [Abdomen Tenderness] : non-tender [No Masses] : no abdominal mass palpated [Abdomen Soft] : soft [] : no hepatosplenomegaly [Supraclavicular Lymph Nodes Enlarged Bilaterally] : no supraclavicular lymphadenopathy [Abnormal Walk] : normal gait [No Clubbing, Cyanosis] : no clubbing or cyanosis of the fingernails [Normal Color / Pigmentation] : normal skin color and pigmentation [Normal] : oriented to person, place, and time

## 2024-04-18 NOTE — ASSESSMENT
[FreeTextEntry1] : IMPRESSION: #  Heartburn, tightness in chest - takes omeprazole as needed.   -  History of Erosive Esophagitis  - EGD by Dr. Shaffer on 2022: Mild erosive esophagitis s/p bx (mild to moderate inflammation with scattered eosinophils negative for IM). Small hiatal hernia. Gastric sleeve anatomy with nodularity s/p bx (neg for IM and HP). Normal duodenum. - EGD 2021: Esophagitis with two erosions in distal esophagus s/p bx (ulcer exudate). Antral nodule 1 cm s/p bx (gastritis with foveolar hyperplasia). Sleeve gastrectomy. Normal duodenum s/p bx (neg for celiac). Gastric bx negative for H. Pylori and IM. - EGD 2021 Linear erythema in distal esophagus s/p bx (esophagitis with rare intraepithelial eosinophils negative for IM). Antral nodule s/p bx (hyperplastic polyp negative for H. pylori). - EGD 2020: Mild erosive esophagitis (esophagitis with eosinophils count of 11 hpf), small hiatal hernia, gastrectomy with antral nodule s/p bx (suggestive of hamartomatous polyp). Biopsies negative for celiac disease, IM, and HP.   # History of Iron def anemia requiring iron infusions - CBC and ferritin normal 2022 - Capsule endoscopy on 2022: Nml visualized small bowel mucosa. Cecum reached. - Patient reports having menorrhagia but no longer- advised follow up with GYN. - Capsule endoscopy in 3/2021: small esophageal ulcer, cecum not reached. Discussed capsule endoscopy Vs MR enterography - patient would like to arrange for a repeat capsule but did not arrange   # History of colon tubular adenoma - Colonoscopy in 2021: Mild internal hemorrhoids, no polyps discovered. Repeat colonoscopy in 2026 - Colonoscopy 2020: 8 mm sessile polyp in ascending colon s/p cold snare polypectomy (tubular adenoma). Quality of bowel prep adequate for polyps greater than 6 mm, washing and suctioning performed.  # Multiple abdominal surgeries (s/p gastric sleeve (2016), lap hansa, )  # Maternal grandmother had stomach cancer   PLAN:  I will plan for an upper endoscopy under monitored anesthesia care to rule out Erosive Reflux Disease, Chavez's Esophagus, assess integrity of anti-reflux barrier, exclude other diseases such as Eosinophilic Esophagitis, Achalasia, Cancer etc.   Risks, benefits, and alternatives of the procedure were discussed with the patient. Patient understands and agrees to proceed with the planned procedure.

## 2024-07-30 ENCOUNTER — NON-APPOINTMENT (OUTPATIENT)
Age: 34
End: 2024-07-30

## 2024-09-04 ENCOUNTER — APPOINTMENT (OUTPATIENT)
Dept: GASTROENTEROLOGY | Facility: HOSPITAL | Age: 34
End: 2024-09-04

## 2025-02-05 DIAGNOSIS — R12 HEARTBURN: ICD-10-CM

## 2025-06-12 NOTE — OB PROVIDER TRIAGE NOTE - [email or web address]_OBGYN_ALL_OB_FT
Attending and PA/NP shared services statement (NON-critical care):
2/21/09  c/s [facility]   SAB x 1  TOP x 2

## (undated) DEVICE — BRUSH CYTO ENDO

## (undated) DEVICE — TUBING IV SET SECOND 34" W/O LOK-BLUNT

## (undated) DEVICE — MASK O2 NON REBREATH 3IN1 ADULT

## (undated) DEVICE — SYR IV POSIFLUSH NS 3ML 30/TY

## (undated) DEVICE — CATH ELECHMSTAT  INJ 7FR 210CM

## (undated) DEVICE — TUBE O2 SUPL CRUSH RESIS CONN SOUTHSIDE ONLY

## (undated) DEVICE — STERIS DEFENDO 3-PIECE KIT (AIR/WATER, SUCTION & BIOPSY VALVES)

## (undated) DEVICE — CATH IV SAFE BC 22G X 1" (BLUE)

## (undated) DEVICE — SYR ALLIANCE II INFLATION 60ML

## (undated) DEVICE — SENSOR O2 FINGER XL ADULT 24/BX 6BX/CA

## (undated) DEVICE — SUCTION YANKAUER TAPERED BULBOUS NO VENT

## (undated) DEVICE — BRUSH COLONOSCOPY CYTOLOGY

## (undated) DEVICE — DRSG CURITY GAUZE SPONGE 4 X 4" 12-PLY

## (undated) DEVICE — FORCEP RADIAL JAW 4 W NDL 2.2MM 2.8MM 160CM YELLOW DISP

## (undated) DEVICE — BITE BLOCK MAXI RUBBER STAMP

## (undated) DEVICE — TUBING CANNULA SALTER LABS NASAL ADULT 7FT

## (undated) DEVICE — SOL IRR POUR H2O 500ML

## (undated) DEVICE — CANISTER SUCTION 1200CC 10/SL

## (undated) DEVICE — TUBING SUCTION CONN 6FT STERILE

## (undated) DEVICE — RADIAL JAW 4 LG CAPACITY WITH NDL

## (undated) DEVICE — Device

## (undated) DEVICE — FORMALIN CUPS 10% BUFFERED

## (undated) DEVICE — TUBING ENDO EXT OLYMPUS 160 24HR USE

## (undated) DEVICE — CATH IV SAFE BC 20G X 1.16" (PINK)

## (undated) DEVICE — VALVE BIOPSY

## (undated) DEVICE — TUBING IV SET GRAVITY 3Y 100" MACRO

## (undated) DEVICE — PACK IV START WITH CHG

## (undated) DEVICE — MASK OXYGEN PANORAMIC